# Patient Record
Sex: MALE | Race: WHITE | Employment: FULL TIME | URBAN - METROPOLITAN AREA
[De-identification: names, ages, dates, MRNs, and addresses within clinical notes are randomized per-mention and may not be internally consistent; named-entity substitution may affect disease eponyms.]

---

## 2017-01-09 ENCOUNTER — PATIENT OUTREACH (OUTPATIENT)
Dept: FAMILY MEDICINE CLINIC | Facility: CLINIC | Age: 37
End: 2017-01-09

## 2017-01-09 DIAGNOSIS — E11.9 TYPE II OR UNSPECIFIED TYPE DIABETES MELLITUS WITHOUT MENTION OF COMPLICATION, NOT STATED AS UNCONTROLLED: Primary | ICD-10-CM

## 2017-01-09 NOTE — PROGRESS NOTES
Spoke to patient. I explained that DR. Alberto Randolph wants him to get repeat lab tests. I informed him that the lab tests are in the system.

## 2017-01-11 NOTE — TELEPHONE ENCOUNTER
Make sure he is a diabetic panel ordered for diabetes mellitus type 2 non-insulin-requiring and a follow-up after the blood tests renew these medications for 1 year please.

## 2017-01-12 RX ORDER — ATORVASTATIN CALCIUM 20 MG/1
TABLET, FILM COATED ORAL
Qty: 90 TABLET | Refills: 3 | Status: SHIPPED | OUTPATIENT
Start: 2017-01-12 | End: 2017-02-09 | Stop reason: DRUGHIGH

## 2017-01-12 RX ORDER — LISINOPRIL 5 MG/1
TABLET ORAL
Qty: 90 TABLET | Refills: 3 | Status: SHIPPED | OUTPATIENT
Start: 2017-01-12 | End: 2017-10-25

## 2017-01-21 ENCOUNTER — TELEPHONE (OUTPATIENT)
Dept: INTERNAL MEDICINE CLINIC | Facility: CLINIC | Age: 37
End: 2017-01-21

## 2017-01-26 ENCOUNTER — APPOINTMENT (OUTPATIENT)
Dept: LAB | Facility: HOSPITAL | Age: 37
End: 2017-01-26
Attending: FAMILY MEDICINE
Payer: COMMERCIAL

## 2017-01-26 DIAGNOSIS — E11.9 TYPE II OR UNSPECIFIED TYPE DIABETES MELLITUS WITHOUT MENTION OF COMPLICATION, NOT STATED AS UNCONTROLLED: ICD-10-CM

## 2017-01-26 LAB
ALBUMIN SERPL BCP-MCNC: 3.9 G/DL (ref 3.5–4.8)
ALBUMIN/GLOB SERPL: 1.2 {RATIO} (ref 1–2)
ALP SERPL-CCNC: 55 U/L (ref 32–100)
ALT SERPL-CCNC: 57 U/L (ref 17–63)
ANION GAP SERPL CALC-SCNC: 7 MMOL/L (ref 0–18)
AST SERPL-CCNC: 34 U/L (ref 15–41)
BILIRUB SERPL-MCNC: 0.6 MG/DL (ref 0.3–1.2)
BUN SERPL-MCNC: 15 MG/DL (ref 8–20)
BUN/CREAT SERPL: 17.2 (ref 10–20)
CALCIUM SERPL-MCNC: 9.2 MG/DL (ref 8.5–10.5)
CHLORIDE SERPL-SCNC: 103 MMOL/L (ref 95–110)
CHOLEST SERPL-MCNC: 87 MG/DL (ref 110–200)
CO2 SERPL-SCNC: 30 MMOL/L (ref 22–32)
CREAT SERPL-MCNC: 0.87 MG/DL (ref 0.5–1.5)
CREAT UR-MCNC: 165.1 MG/DL
GLOBULIN PLAS-MCNC: 3.2 G/DL (ref 2.5–3.7)
GLUCOSE SERPL-MCNC: 112 MG/DL (ref 70–99)
HBA1C MFR BLD: 6.1 % (ref 4–6)
HDLC SERPL-MCNC: 37 MG/DL
LDLC SERPL CALC-MCNC: 37 MG/DL (ref 0–99)
MICROALBUMIN UR-MCNC: 0 MG/DL (ref 0–1.8)
MICROALBUMIN/CREAT UR: 0 MG/G{CREAT} (ref 0–20)
NONHDLC SERPL-MCNC: 50 MG/DL
OSMOLALITY UR CALC.SUM OF ELEC: 292 MOSM/KG (ref 275–295)
POTASSIUM SERPL-SCNC: 4.3 MMOL/L (ref 3.3–5.1)
PROT SERPL-MCNC: 7.1 G/DL (ref 5.9–8.4)
SODIUM SERPL-SCNC: 140 MMOL/L (ref 136–144)
TRIGL SERPL-MCNC: 64 MG/DL (ref 1–149)
TSH SERPL-ACNC: 1.55 UIU/ML (ref 0.34–5.6)

## 2017-01-26 PROCEDURE — 82043 UR ALBUMIN QUANTITATIVE: CPT

## 2017-01-26 PROCEDURE — 82570 ASSAY OF URINE CREATININE: CPT

## 2017-01-26 PROCEDURE — 84443 ASSAY THYROID STIM HORMONE: CPT

## 2017-01-26 PROCEDURE — 83036 HEMOGLOBIN GLYCOSYLATED A1C: CPT

## 2017-01-26 PROCEDURE — 80061 LIPID PANEL: CPT

## 2017-01-26 PROCEDURE — 36415 COLL VENOUS BLD VENIPUNCTURE: CPT

## 2017-01-26 PROCEDURE — 80053 COMPREHEN METABOLIC PANEL: CPT

## 2017-01-27 ENCOUNTER — TELEPHONE (OUTPATIENT)
Dept: FAMILY MEDICINE CLINIC | Facility: CLINIC | Age: 37
End: 2017-01-27

## 2017-01-27 DIAGNOSIS — G47.30 SLEEP APNEA, UNSPECIFIED: Primary | ICD-10-CM

## 2017-01-30 NOTE — TELEPHONE ENCOUNTER
Per pt, he stts that he's been using Cpap for a long time and would like to talk to [facility] or RN why he needs to come and see him first.

## 2017-02-03 NOTE — TELEPHONE ENCOUNTER
Refill request for Microlet Lancets to be used twice daily, quantity of 100 with 3 refills was approved and sent to patient's pharmacy.

## 2017-02-09 ENCOUNTER — TELEPHONE (OUTPATIENT)
Dept: FAMILY MEDICINE CLINIC | Facility: CLINIC | Age: 37
End: 2017-02-09

## 2017-02-09 DIAGNOSIS — E78.5 HYPERLIPIDEMIA, UNSPECIFIED HYPERLIPIDEMIA TYPE: Primary | ICD-10-CM

## 2017-02-09 RX ORDER — ATORVASTATIN CALCIUM 10 MG/1
TABLET, FILM COATED ORAL
Qty: 45 TABLET | Refills: 3 | Status: SHIPPED | OUTPATIENT
Start: 2017-02-09 | End: 2017-10-25

## 2017-02-09 NOTE — TELEPHONE ENCOUNTER
----- Message from Greyson Luna DO sent at 1/29/2017  2:05 PM CST -----  GREAT! Reduce lipitor to 5mg nightly 1/2 lipitor 10mg tablet #45 #3refills. Continue other meds and f/u. He must be doing well.

## 2017-02-09 NOTE — TELEPHONE ENCOUNTER
Reviewed 1/26/17 lab results with pt along with Dr SUDHEER Case recommendations. He verbalized understanding and agreed with md plan.

## 2017-05-01 ENCOUNTER — TELEPHONE (OUTPATIENT)
Dept: INTERNAL MEDICINE CLINIC | Facility: CLINIC | Age: 37
End: 2017-05-01

## 2017-05-01 DIAGNOSIS — E11.9 DIABETES MELLITUS WITHOUT COMPLICATION (HCC): Primary | ICD-10-CM

## 2017-05-01 NOTE — TELEPHONE ENCOUNTER
Pt is due for DAA & PCP f/u. Discussed in detail w/patient. Appt made for 5/11/17 w/DAA. Referral placed in [facility].

## 2017-05-11 ENCOUNTER — OFFICE VISIT (OUTPATIENT)
Dept: OPHTHALMOLOGY | Facility: CLINIC | Age: 37
End: 2017-05-11

## 2017-05-11 ENCOUNTER — NURSE ONLY (OUTPATIENT)
Dept: OPHTHALMOLOGY | Facility: CLINIC | Age: 37
End: 2017-05-11

## 2017-05-11 VITALS — DIASTOLIC BLOOD PRESSURE: 80 MMHG | SYSTOLIC BLOOD PRESSURE: 100 MMHG

## 2017-05-11 DIAGNOSIS — E11.9 DIABETES MELLITUS TYPE 2 WITHOUT RETINOPATHY (HCC): Primary | ICD-10-CM

## 2017-05-11 DIAGNOSIS — E11.9 TYPE 2 DIABETES MELLITUS WITHOUT COMPLICATION, WITHOUT LONG-TERM CURRENT USE OF INSULIN (HCC): Primary | ICD-10-CM

## 2017-05-11 PROCEDURE — 99212 OFFICE O/P EST SF 10 MIN: CPT | Performed by: OPHTHALMOLOGY

## 2017-05-11 PROCEDURE — 99243 OFF/OP CNSLTJ NEW/EST LOW 30: CPT | Performed by: OPHTHALMOLOGY

## 2017-05-11 NOTE — PROGRESS NOTES
Diabetic Annual Assessment Clinical Note    /80 mmHg    Foot Assessment    Visual Inspection of the feet completed at appointment.     · Skin Abnormal, dry skin noted   · Callous Abnormal, callous both heels  · Redness Normal  · Swelling Normal  · [facility]

## 2017-05-11 NOTE — PATIENT INSTRUCTIONS
Diabetes mellitus type 2 without retinopathy ([facility] [address]  Diabetes type II: no background of retinopathy, no signs of neovascularization noted. Discussed ocular and systemic benefits of blood sugar control.   Diagnosis and treatment discussed in detail with estelita

## 2017-05-11 NOTE — PROGRESS NOTES
Wil Pablo is here to have medication reconciliation performed by the clinical pharmacist as part of the Diabetic Annual Assessment. Medication Reconciliation: Patient understands his medication regimen and why he has to take his medications. hepatitis B series. Follow-up Appointments: Recommended patient set up follow-up appointment with Dr. Shun Folye.     Jacklyn Bazzi, PharmD Candidate 2017    Juan José Abdi, DottieD  Clinical Pharmacist

## 2017-05-11 NOTE — PROGRESS NOTES
Neda Nguyen is a 39year old male.     HPI:     HPI     Consult    Additional comments: [facility]           Diabetic Eye Exam    Additional comments: Pt has been a diabetic for 1 1/2 years  1 1/2 years on pills/  0 years on Insulin 1 Year.  Disp: 15 pen Rfl: 0   BD PEN NEEDLE MINI U/F 31G X 5 MM Does not apply Misc USE AS DIRECTED Disp: 100 each Rfl: 12   Blood Glucose Monitoring Suppl (DENNY CONTOUR MONITOR) Does not apply Device Please dispense 1 meter along with test strips and matt ASSESSMENT/PLAN:     Diagnoses and Plan:     Diabetes mellitus type 2 without retinopathy ([facility] [address]  Diabetes type II: no background of retinopathy, no signs of neovascularization noted. Discussed ocular and systemic benefits of blood sugar control.   D

## 2017-05-11 NOTE — PROGRESS NOTES
Russell Ayers is a 39year old male.     HPI:       Patient History:  Past Medical History   Diagnosis Date   • Unspecified sleep apnea    • Tonsillitis    • Diabetes [facility])        Surgical History: Russell Ayers has past surgical history that in found for this encounter. No orders of the defined types were placed in this encounter. Meds This Visit:    No prescriptions requested or ordered in this encounter     Follow up instructions:  No Follow-up on file.     5/11/2017  Scribed by: Fouzia Davis

## 2017-07-21 RX ORDER — LIRAGLUTIDE 6 MG/ML
INJECTION SUBCUTANEOUS
Qty: 27 PEN | Refills: 11 | Status: SHIPPED | OUTPATIENT
Start: 2017-07-21 | End: 2017-08-01

## 2017-07-21 NOTE — TELEPHONE ENCOUNTER
Refill protocol failed because the patient did not meet the protocol criteria.     NO f/u appt LOV 1/11/2016    IHSAN please assist with Appt

## 2017-07-21 NOTE — TELEPHONE ENCOUNTER
May have refills for 1 year.   Needs diabetic panel diagnosis diabetes mellitus type 2 controlled no insulin no complications

## 2017-07-21 NOTE — TELEPHONE ENCOUNTER
Diabetes Medications  Protocol Criteria:  · Appointment scheduled in the past 6 months or the next 3 months  · A1C < 7.5 in the past 6 months  · Creatinine in the past 12 months  · Creatinine result < 1.5   Recent Outpatient Visits            2 months ago

## 2017-09-11 DIAGNOSIS — E11.9 TYPE 2 DIABETES MELLITUS WITHOUT COMPLICATION, UNSPECIFIED LONG TERM INSULIN USE STATUS: Primary | ICD-10-CM

## 2017-09-12 RX ORDER — FLURBIPROFEN SODIUM 0.3 MG/ML
SOLUTION/ DROPS OPHTHALMIC
Qty: 100 EACH | Refills: 11 | Status: SHIPPED | OUTPATIENT
Start: 2017-09-12 | End: 2017-10-25

## 2017-09-26 ENCOUNTER — OFFICE VISIT (OUTPATIENT)
Dept: FAMILY MEDICINE CLINIC | Facility: CLINIC | Age: 37
End: 2017-09-26

## 2017-09-26 VITALS
SYSTOLIC BLOOD PRESSURE: 99 MMHG | WEIGHT: 274 LBS | HEART RATE: 69 BPM | DIASTOLIC BLOOD PRESSURE: 64 MMHG | BODY MASS INDEX: 36.31 KG/M2 | HEIGHT: 73 IN

## 2017-09-26 DIAGNOSIS — E78.5 HYPERLIPIDEMIA, UNSPECIFIED HYPERLIPIDEMIA TYPE: ICD-10-CM

## 2017-09-26 DIAGNOSIS — G47.33 OSA (OBSTRUCTIVE SLEEP APNEA): ICD-10-CM

## 2017-09-26 DIAGNOSIS — E11.9 TYPE 2 DIABETES MELLITUS WITHOUT COMPLICATION, UNSPECIFIED LONG TERM INSULIN USE STATUS: Primary | ICD-10-CM

## 2017-09-26 DIAGNOSIS — Z23 NEED FOR VACCINATION: ICD-10-CM

## 2017-09-26 DIAGNOSIS — Z72.0 TOBACCO ABUSE: ICD-10-CM

## 2017-09-26 DIAGNOSIS — E66.09 NON MORBID OBESITY DUE TO EXCESS CALORIES: ICD-10-CM

## 2017-09-26 PROCEDURE — 90686 IIV4 VACC NO PRSV 0.5 ML IM: CPT | Performed by: FAMILY MEDICINE

## 2017-09-26 PROCEDURE — 99214 OFFICE O/P EST MOD 30 MIN: CPT | Performed by: FAMILY MEDICINE

## 2017-09-26 PROCEDURE — 99212 OFFICE O/P EST SF 10 MIN: CPT | Performed by: FAMILY MEDICINE

## 2017-09-26 PROCEDURE — 90471 IMMUNIZATION ADMIN: CPT | Performed by: FAMILY MEDICINE

## 2017-09-26 NOTE — PROGRESS NOTES
Diabetic Visit    My sugars have been good. 5 cigarettes a week. Patient denies problems with their medication. Denies ulcers, chest pain, dyspnea on exertion.   nad  Throat crowded  Ht rrr no M no S3 S4  Lung clear no wheeze  abd soft nontender  Car

## 2017-09-28 ENCOUNTER — APPOINTMENT (OUTPATIENT)
Dept: LAB | Age: 37
End: 2017-09-28
Attending: FAMILY MEDICINE
Payer: COMMERCIAL

## 2017-09-28 DIAGNOSIS — E11.9 TYPE 2 DIABETES MELLITUS WITHOUT COMPLICATION, UNSPECIFIED LONG TERM INSULIN USE STATUS: ICD-10-CM

## 2017-09-28 PROCEDURE — 80053 COMPREHEN METABOLIC PANEL: CPT

## 2017-09-28 PROCEDURE — 83036 HEMOGLOBIN GLYCOSYLATED A1C: CPT

## 2017-09-28 PROCEDURE — 82570 ASSAY OF URINE CREATININE: CPT

## 2017-09-28 PROCEDURE — 82043 UR ALBUMIN QUANTITATIVE: CPT

## 2017-09-28 PROCEDURE — 80061 LIPID PANEL: CPT

## 2017-09-28 PROCEDURE — 36415 COLL VENOUS BLD VENIPUNCTURE: CPT

## 2017-10-25 ENCOUNTER — TELEPHONE (OUTPATIENT)
Dept: FAMILY MEDICINE CLINIC | Facility: CLINIC | Age: 37
End: 2017-10-25

## 2017-10-25 DIAGNOSIS — E78.5 HYPERLIPIDEMIA, UNSPECIFIED HYPERLIPIDEMIA TYPE: ICD-10-CM

## 2017-10-25 RX ORDER — LISINOPRIL 5 MG/1
5 TABLET ORAL
Qty: 90 TABLET | Refills: 0 | Status: SHIPPED | OUTPATIENT
Start: 2017-10-25 | End: 2018-02-01

## 2017-10-25 RX ORDER — ATORVASTATIN CALCIUM 10 MG/1
TABLET, FILM COATED ORAL
Qty: 45 TABLET | Refills: 0 | Status: SHIPPED | OUTPATIENT
Start: 2017-10-25 | End: 2018-02-01

## 2017-10-25 RX ORDER — METFORMIN HYDROCHLORIDE 500 MG/1
TABLET, EXTENDED RELEASE ORAL
Qty: 180 TABLET | Refills: 0 | Status: SHIPPED | OUTPATIENT
Start: 2017-10-25 | End: 2018-02-01

## 2017-10-25 NOTE — TELEPHONE ENCOUNTER
LMTCB, transfer to triage, need to verify pharmacy information as noted prescriptions have been sent to retail [facility] pharmacy    Requesting Metformin, Lisinopril, Victoza, Atorvastatin, pen needles, lancets, and test strip refills  Meds pending receipt of ph [facility]    Nurse Only    5 months ago Diabetes mellitus type 2 without retinopathy [facility])    [facility] Maritza Olmedo MD    Office Visit    1 year ago Ear fullness, left    [facility] Derick Mood, DO    Office Visit            Lab Results  Component Value Date   LDL 66 09/28/2017       Lab Results  Component Value Date   ALT 24 09/28/2017

## 2017-11-29 RX ORDER — METFORMIN HYDROCHLORIDE 500 MG/1
TABLET, EXTENDED RELEASE ORAL
Qty: 180 TABLET | Refills: 4 | OUTPATIENT
Start: 2017-11-29

## 2017-11-30 ENCOUNTER — TELEPHONE (OUTPATIENT)
Dept: FAMILY MEDICINE CLINIC | Facility: CLINIC | Age: 37
End: 2017-11-30

## 2017-11-30 NOTE — TELEPHONE ENCOUNTER
Stephie Seth from [facility] called in to check the refill status on a request she had sent via fax on10/26.  CSS could not see an encounter started for cpap supply refill order, so Stephie Seth was informed and she stated she would re-fax the request again

## 2017-12-01 NOTE — TELEPHONE ENCOUNTER
CPAP supply order was already faxed on 11/07/17. Form with confirmation was scanned. Printed form and refaxed back to [facility].

## 2017-12-14 RX ORDER — ASPIRIN 81 MG/1
TABLET ORAL
Qty: 90 TABLET | Refills: 3 | Status: SHIPPED | OUTPATIENT
Start: 2017-12-14 | End: 2018-09-26

## 2017-12-14 NOTE — TELEPHONE ENCOUNTER
Please advise regarding pended refill request as does not fall under a protocol.      Last Rx= 12/2016 x 1 year  Recent Outpatient Visits            2 months ago Type 2 diabetes mellitus without complication, unspecified long term insulin use status ([facility]

## 2018-02-01 DIAGNOSIS — E78.5 HYPERLIPIDEMIA, UNSPECIFIED HYPERLIPIDEMIA TYPE: ICD-10-CM

## 2018-02-01 RX ORDER — METFORMIN HYDROCHLORIDE 500 MG/1
TABLET, EXTENDED RELEASE ORAL
Qty: 180 TABLET | Refills: 11 | Status: SHIPPED | OUTPATIENT
Start: 2018-02-01 | End: 2019-03-06

## 2018-02-01 RX ORDER — ATORVASTATIN CALCIUM 10 MG/1
TABLET, FILM COATED ORAL
Qty: 45 TABLET | Refills: 11 | Status: SHIPPED | OUTPATIENT
Start: 2018-02-01 | End: 2019-05-15

## 2018-02-01 RX ORDER — LISINOPRIL 5 MG/1
5 TABLET ORAL
Qty: 90 TABLET | Refills: 11 | Status: SHIPPED | OUTPATIENT
Start: 2018-02-01 | End: 2019-02-19

## 2018-08-08 ENCOUNTER — TELEPHONE (OUTPATIENT)
Dept: FAMILY MEDICINE CLINIC | Facility: CLINIC | Age: 38
End: 2018-08-08

## 2018-08-08 DIAGNOSIS — G47.33 OSA (OBSTRUCTIVE SLEEP APNEA): ICD-10-CM

## 2018-08-08 DIAGNOSIS — E11.9 DIABETES MELLITUS TYPE 2 WITHOUT RETINOPATHY (HCC): ICD-10-CM

## 2018-08-08 DIAGNOSIS — E78.5 HYPERLIPIDEMIA, UNSPECIFIED HYPERLIPIDEMIA TYPE: Primary | ICD-10-CM

## 2018-08-08 NOTE — TELEPHONE ENCOUNTER
Per spouse  would like an order to get his blood work done prior to his physical appt. Please, call pt at [phone] when the order is in the system.

## 2018-08-16 ENCOUNTER — LAB ENCOUNTER (OUTPATIENT)
Dept: LAB | Age: 38
End: 2018-08-16
Attending: FAMILY MEDICINE
Payer: COMMERCIAL

## 2018-08-16 DIAGNOSIS — E11.9 DIABETES MELLITUS TYPE 2 WITHOUT RETINOPATHY (HCC): ICD-10-CM

## 2018-08-16 DIAGNOSIS — E78.5 HYPERLIPIDEMIA, UNSPECIFIED HYPERLIPIDEMIA TYPE: ICD-10-CM

## 2018-08-16 DIAGNOSIS — G47.33 OSA (OBSTRUCTIVE SLEEP APNEA): ICD-10-CM

## 2018-08-16 LAB
ALBUMIN SERPL BCP-MCNC: 3.9 G/DL (ref 3.5–4.8)
ALBUMIN/GLOB SERPL: 1.4 {RATIO} (ref 1–2)
ALP SERPL-CCNC: 48 U/L (ref 32–100)
ALT SERPL-CCNC: 31 U/L (ref 17–63)
ANION GAP SERPL CALC-SCNC: 12 MMOL/L (ref 0–18)
AST SERPL-CCNC: 20 U/L (ref 15–41)
BASOPHILS # BLD: 0.1 K/UL (ref 0–0.2)
BASOPHILS NFR BLD: 1 %
BILIRUB SERPL-MCNC: 0.6 MG/DL (ref 0.3–1.2)
BUN SERPL-MCNC: 15 MG/DL (ref 8–20)
BUN/CREAT SERPL: 16.9 (ref 10–20)
CALCIUM SERPL-MCNC: 9 MG/DL (ref 8.5–10.5)
CHLORIDE SERPL-SCNC: 102 MMOL/L (ref 95–110)
CHOLEST SERPL-MCNC: 126 MG/DL (ref 110–200)
CO2 SERPL-SCNC: 23 MMOL/L (ref 22–32)
CREAT SERPL-MCNC: 0.89 MG/DL (ref 0.5–1.5)
CREAT UR-MCNC: 214.8 MG/DL
EOSINOPHIL # BLD: 0.2 K/UL (ref 0–0.7)
EOSINOPHIL NFR BLD: 3 %
ERYTHROCYTE [DISTWIDTH] IN BLOOD BY AUTOMATED COUNT: 13 % (ref 11–15)
GLOBULIN PLAS-MCNC: 2.8 G/DL (ref 2.5–3.7)
GLUCOSE SERPL-MCNC: 101 MG/DL (ref 70–99)
HCT VFR BLD AUTO: 46.3 % (ref 41–52)
HDLC SERPL-MCNC: 41 MG/DL
HGB BLD-MCNC: 15.8 G/DL (ref 13.5–17.5)
LDLC SERPL CALC-MCNC: 57 MG/DL (ref 0–99)
LYMPHOCYTES # BLD: 1.9 K/UL (ref 1–4)
LYMPHOCYTES NFR BLD: 26 %
MCH RBC QN AUTO: 30.1 PG (ref 27–32)
MCHC RBC AUTO-ENTMCNC: 34.1 G/DL (ref 32–37)
MCV RBC AUTO: 88.1 FL (ref 80–100)
MICROALBUMIN UR-MCNC: 0.5 MG/DL (ref 0–1.8)
MICROALBUMIN/CREAT UR: 2.3 MG/G{CREAT} (ref 0–20)
MONOCYTES # BLD: 0.6 K/UL (ref 0–1)
MONOCYTES NFR BLD: 8 %
NEUTROPHILS # BLD AUTO: 4.6 K/UL (ref 1.8–7.7)
NEUTROPHILS NFR BLD: 63 %
NONHDLC SERPL-MCNC: 85 MG/DL
OSMOLALITY UR CALC.SUM OF ELEC: 285 MOSM/KG (ref 275–295)
PATIENT FASTING: YES
PLATELET # BLD AUTO: 204 K/UL (ref 140–400)
PMV BLD AUTO: 9.1 FL (ref 7.4–10.3)
POTASSIUM SERPL-SCNC: 4.1 MMOL/L (ref 3.3–5.1)
PROT SERPL-MCNC: 6.7 G/DL (ref 5.9–8.4)
RBC # BLD AUTO: 5.26 M/UL (ref 4.5–5.9)
SODIUM SERPL-SCNC: 137 MMOL/L (ref 136–144)
TRIGL SERPL-MCNC: 141 MG/DL (ref 1–149)
WBC # BLD AUTO: 7.3 K/UL (ref 4–11)

## 2018-08-16 PROCEDURE — 85025 COMPLETE CBC W/AUTO DIFF WBC: CPT

## 2018-08-16 PROCEDURE — 82043 UR ALBUMIN QUANTITATIVE: CPT

## 2018-08-16 PROCEDURE — 80061 LIPID PANEL: CPT

## 2018-08-16 PROCEDURE — 36415 COLL VENOUS BLD VENIPUNCTURE: CPT

## 2018-08-16 PROCEDURE — 82570 ASSAY OF URINE CREATININE: CPT

## 2018-08-16 PROCEDURE — 80053 COMPREHEN METABOLIC PANEL: CPT

## 2018-08-16 PROCEDURE — 83036 HEMOGLOBIN GLYCOSYLATED A1C: CPT

## 2018-08-17 LAB — HBA1C MFR BLD: 6 % (ref 4–6)

## 2018-09-13 ENCOUNTER — OFFICE VISIT (OUTPATIENT)
Dept: FAMILY MEDICINE CLINIC | Facility: CLINIC | Age: 38
End: 2018-09-13
Payer: COMMERCIAL

## 2018-09-13 VITALS
WEIGHT: 284 LBS | HEIGHT: 73 IN | TEMPERATURE: 98 F | DIASTOLIC BLOOD PRESSURE: 71 MMHG | SYSTOLIC BLOOD PRESSURE: 107 MMHG | HEART RATE: 75 BPM | BODY MASS INDEX: 37.64 KG/M2

## 2018-09-13 DIAGNOSIS — E78.5 HYPERLIPIDEMIA, UNSPECIFIED HYPERLIPIDEMIA TYPE: ICD-10-CM

## 2018-09-13 DIAGNOSIS — Z72.0 TOBACCO ABUSE: ICD-10-CM

## 2018-09-13 DIAGNOSIS — G47.33 OSA (OBSTRUCTIVE SLEEP APNEA): ICD-10-CM

## 2018-09-13 DIAGNOSIS — E66.09 NON MORBID OBESITY DUE TO EXCESS CALORIES: ICD-10-CM

## 2018-09-13 DIAGNOSIS — E11.9 DIABETES MELLITUS TYPE 2 WITHOUT RETINOPATHY (HCC): ICD-10-CM

## 2018-09-13 DIAGNOSIS — Z00.00 ANNUAL PHYSICAL EXAM: Primary | ICD-10-CM

## 2018-09-13 PROCEDURE — 99395 PREV VISIT EST AGE 18-39: CPT | Performed by: FAMILY MEDICINE

## 2018-09-13 PROCEDURE — 90670 PCV13 VACCINE IM: CPT | Performed by: FAMILY MEDICINE

## 2018-09-13 PROCEDURE — 90471 IMMUNIZATION ADMIN: CPT | Performed by: FAMILY MEDICINE

## 2018-09-13 NOTE — PROGRESS NOTES
REASON FOR VISIT:    Jade Piper is a 43year old male who presents for an [address]. I feel good.     Patient Active Problem List:     Simple or chronic serous otitis media     Diabetes mellitus type 2 without retinopathy ([facility] [address] Diuretics)        Potassium  Annually Potassium (mmol/L)   Date Value   08/16/2018 4.1     POTASSIUM (P) (mmol/L)   Date Value   03/23/2016 4.2       Creatinine  Annually Creatinine (mg/dL)   Date Value   08/16/2018 0.89     CREATININE (P) (mg/dL)   Date V 3 months and refills for one year Disp: 1 Device Rfl: 3      MEDICAL INFORMATION:   Past Medical History:   Diagnosis Date   • [facility] ([facility]    • Tonsillitis    • Unspecified sleep apnea       Past Surgical History:  2000: TONSILLECTOMY   Family History masses, HSM or tenderness  : two descended testes, no masses, no hernia, no penile lesions  RECTAL: deferred  MUSCULOSKELETAL: back is not tender, FROM of the back  EXTREMITIES: no cyanosis, clubbing or edema  NEURO: Oriented times three, cranial nerves and not immune

## 2018-09-26 NOTE — TELEPHONE ENCOUNTER
[facility]. [facility] do you approve aspirin 81 mg?  To be send to mail pharmacy

## 2018-09-27 RX ORDER — ASPIRIN 81 MG/1
TABLET ORAL
Qty: 90 TABLET | Refills: 11 | Status: SHIPPED | OUTPATIENT
Start: 2018-09-27 | End: 2019-10-05

## 2018-12-10 RX ORDER — ASPIRIN 81 MG/1
TABLET ORAL
Qty: 90 TABLET | Refills: 0 | Status: SHIPPED | OUTPATIENT
Start: 2018-12-10 | End: 2019-01-10

## 2018-12-11 NOTE — TELEPHONE ENCOUNTER
Refill passed per [facility], [facility] protocol.   Refill Protocol Appointment Criteria  · Appointment scheduled in the past 6 months or in the next 3 months  Recent Outpatient Visits            2 months ago Annual physical exam    Jean Carlosa. Marky Jacinto 1

## 2019-01-02 NOTE — TELEPHONE ENCOUNTER
Refill passed per [facility], [facility] protocol.   Refill Protocol Appointment Criteria  · Appointment scheduled in the past 12 months or in the next 3 months  Recent Outpatient Visits            3 months ago Annual physical exam    [facility]

## 2019-01-10 ENCOUNTER — OFFICE VISIT (OUTPATIENT)
Dept: OTOLARYNGOLOGY | Facility: CLINIC | Age: 39
End: 2019-01-10
Payer: COMMERCIAL

## 2019-01-10 ENCOUNTER — OFFICE VISIT (OUTPATIENT)
Dept: AUDIOLOGY | Facility: CLINIC | Age: 39
End: 2019-01-10
Payer: COMMERCIAL

## 2019-01-10 VITALS
HEIGHT: 73 IN | WEIGHT: 282 LBS | TEMPERATURE: 98 F | BODY MASS INDEX: 37.37 KG/M2 | SYSTOLIC BLOOD PRESSURE: 122 MMHG | DIASTOLIC BLOOD PRESSURE: 74 MMHG

## 2019-01-10 DIAGNOSIS — H61.20 WAX IN EAR: Primary | ICD-10-CM

## 2019-01-10 DIAGNOSIS — H65.32 CHRONIC MUCOID OTITIS MEDIA OF LEFT EAR: ICD-10-CM

## 2019-01-10 DIAGNOSIS — H69.93 EUSTACHIAN TUBE DISORDER, BILATERAL: Primary | ICD-10-CM

## 2019-01-10 PROCEDURE — 92567 TYMPANOMETRY: CPT | Performed by: AUDIOLOGIST

## 2019-01-10 PROCEDURE — 69210 REMOVE IMPACTED EAR WAX UNI: CPT | Performed by: OTOLARYNGOLOGY

## 2019-01-10 PROCEDURE — 92557 COMPREHENSIVE HEARING TEST: CPT | Performed by: AUDIOLOGIST

## 2019-01-10 PROCEDURE — 99243 OFF/OP CNSLTJ NEW/EST LOW 30: CPT | Performed by: OTOLARYNGOLOGY

## 2019-01-10 PROCEDURE — 99212 OFFICE O/P EST SF 10 MIN: CPT | Performed by: OTOLARYNGOLOGY

## 2019-01-10 RX ORDER — FLUTICASONE PROPIONATE 50 MCG
2 SPRAY, SUSPENSION (ML) NASAL DAILY
Qty: 3 BOTTLE | Refills: 4 | Status: SHIPPED | OUTPATIENT
Start: 2019-01-10 | End: 2019-02-09

## 2019-01-10 RX ORDER — METHYLPREDNISOLONE 4 MG/1
TABLET ORAL
Qty: 1 PACKAGE | Refills: 0 | Status: SHIPPED | OUTPATIENT
Start: 2019-01-10 | End: 2019-02-07 | Stop reason: ALTCHOICE

## 2019-01-10 NOTE — PROGRESS NOTES
AUDIOGRAM     Neda Nguyen was referred for testing by Karla Dandy for decreased hearing in both ears, left worse than right   7/21/1980  [id number]        Otoscopic inspection: right ear no cerumen; left ear no cerumen.        Tests/Procedu

## 2019-01-10 NOTE — PROGRESS NOTES
Sabrina Dover is a 43year old male. Patient presents with:  Ear Wax: bilateral ear cleaning        HISTORY OF PRESENT ILLNESS  1/10/2019   Here for evaluation of left greater than right hearing loss.  Patient feels this has worsened over the [place] Sleep Concern: Not Asked        Stress Concern: Not Asked        Weight Concern: Not Asked        Special Diet: Not Asked        Back Care: Not Asked        Exercise: Not Asked        Bike Helmet: Not Asked        Seat Belt: Not Asked        Self-Exam apnea   Neurological Normal Memory - Normal. Cranial nerves - Cranial nerves II through XII grossly intact. Neck Exam Normal  normal to inspection and palpation   Psychiatric Normal   Appropriate mood and affect.    Lymph Detail Normal  no lymphadenopathy TABLETS BY MOUTH EVERY DAY WITH BREAKFAST, Disp: 180 tablet, Rfl: 11  •  Glucose Blood (DENNY CONTOUR TEST) In Vitro Strip, TEST TWICE A DAY, Disp: 100 strip, Rfl: 3  •  DENNY MICROLET LANCETS Does not apply Misc, TEST TWICE A DAY, Disp: 100 each, Rfl: 3

## 2019-02-07 ENCOUNTER — OFFICE VISIT (OUTPATIENT)
Dept: FAMILY MEDICINE CLINIC | Facility: CLINIC | Age: 39
End: 2019-02-07
Payer: COMMERCIAL

## 2019-02-07 ENCOUNTER — HOSPITAL ENCOUNTER (OUTPATIENT)
Dept: GENERAL RADIOLOGY | Age: 39
Discharge: HOME | End: 2019-02-07
Attending: FAMILY MEDICINE
Payer: COMMERCIAL

## 2019-02-07 VITALS
TEMPERATURE: 98 F | BODY MASS INDEX: 37.64 KG/M2 | HEIGHT: 73 IN | HEART RATE: 74 BPM | WEIGHT: 284 LBS | DIASTOLIC BLOOD PRESSURE: 64 MMHG | SYSTOLIC BLOOD PRESSURE: 101 MMHG | RESPIRATION RATE: 16 BRPM

## 2019-02-07 DIAGNOSIS — M54.50 CHRONIC LOW BACK PAIN WITHOUT SCIATICA, UNSPECIFIED BACK PAIN LATERALITY: ICD-10-CM

## 2019-02-07 DIAGNOSIS — G89.29 CHRONIC LOW BACK PAIN WITHOUT SCIATICA, UNSPECIFIED BACK PAIN LATERALITY: ICD-10-CM

## 2019-02-07 DIAGNOSIS — G89.29 CHRONIC LOW BACK PAIN WITHOUT SCIATICA, UNSPECIFIED BACK PAIN LATERALITY: Primary | ICD-10-CM

## 2019-02-07 DIAGNOSIS — M54.50 CHRONIC LOW BACK PAIN WITHOUT SCIATICA, UNSPECIFIED BACK PAIN LATERALITY: Primary | ICD-10-CM

## 2019-02-07 PROCEDURE — 99212 OFFICE O/P EST SF 10 MIN: CPT | Performed by: FAMILY MEDICINE

## 2019-02-07 PROCEDURE — 72110 X-RAY EXAM L-2 SPINE 4/>VWS: CPT | Performed by: FAMILY MEDICINE

## 2019-02-07 PROCEDURE — 99213 OFFICE O/P EST LOW 20 MIN: CPT | Performed by: FAMILY MEDICINE

## 2019-02-07 RX ORDER — CELECOXIB 200 MG/1
200 CAPSULE ORAL 2 TIMES DAILY
Qty: 60 CAPSULE | Refills: 0 | Status: SHIPPED | OUTPATIENT
Start: 2019-02-07 | End: 2019-03-06

## 2019-02-07 RX ORDER — HYDROCODONE BITARTRATE AND ACETAMINOPHEN 5; 325 MG/1; MG/1
1 TABLET ORAL EVERY 4 HOURS PRN
Qty: 20 TABLET | Refills: 0 | Status: SHIPPED | OUTPATIENT
Start: 2019-02-07 | End: 2019-10-24 | Stop reason: ALTCHOICE
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
## 2019-02-12 RX ORDER — BLOOD SUGAR DIAGNOSTIC
STRIP MISCELLANEOUS
Qty: 200 STRIP | Refills: 3 | Status: SHIPPED | OUTPATIENT
Start: 2019-02-12 | End: 2019-10-10

## 2019-02-12 RX ORDER — LANCETS
EACH MISCELLANEOUS
Qty: 200 EACH | Refills: 3 | Status: SHIPPED | OUTPATIENT
Start: 2019-02-12 | End: 2019-10-10

## 2019-02-12 RX ORDER — BLOOD-GLUCOSE METER
EACH MISCELLANEOUS
Qty: 1 KIT | Refills: 0 | Status: SHIPPED | OUTPATIENT
Start: 2019-02-12 | End: 2021-01-13

## 2019-02-12 NOTE — TELEPHONE ENCOUNTER
Fax received at [facility] requesting refill for accu chek rochelle PL test strips and accu chek softclix lancets.  Please include strength, dosage, number of refills

## 2019-02-13 NOTE — TELEPHONE ENCOUNTER
Refill passed per [facility] protocol.   Refill Protocol Appointment Criteria  · Appointment scheduled in the past 12 months or in the next 3 months  Recent Outpatient Visits            5 days ago Chronic low back pain without sciatica, unspecified itzel

## 2019-02-18 ENCOUNTER — TELEPHONE (OUTPATIENT)
Dept: FAMILY MEDICINE CLINIC | Facility: CLINIC | Age: 39
End: 2019-02-18

## 2019-02-19 RX ORDER — LISINOPRIL 5 MG/1
5 TABLET ORAL
Qty: 90 TABLET | Refills: 0 | Status: SHIPPED | OUTPATIENT
Start: 2019-02-19 | End: 2019-04-20

## 2019-02-19 NOTE — TELEPHONE ENCOUNTER
Current Outpatient Medications:  •  lisinopril 5 MG Oral Tab, Take 1 tablet (5 mg total) by mouth once daily. , Disp: 90 tablet, Rfl: 11

## 2019-02-20 NOTE — TELEPHONE ENCOUNTER
Refill passed per [facility], [facility] protocol.   Hypertensive Medications  Protocol Criteria:  · Appointment scheduled in the past 6 months or in the next 3 months  · BMP or CMP in the past 12 months  · Creatinine result < 2  Recent Outpatient Visits

## 2019-03-07 RX ORDER — CELECOXIB 200 MG/1
CAPSULE ORAL
Qty: 60 CAPSULE | Refills: 0 | Status: SHIPPED | OUTPATIENT
Start: 2019-03-07 | End: 2019-10-24 | Stop reason: ALTCHOICE

## 2019-03-07 RX ORDER — METFORMIN HYDROCHLORIDE 500 MG/1
TABLET, EXTENDED RELEASE ORAL
Qty: 180 TABLET | Refills: 11 | Status: SHIPPED | OUTPATIENT
Start: 2019-03-07 | End: 2020-03-10

## 2019-03-07 NOTE — TELEPHONE ENCOUNTER
Please advise in regards to refill request. Thank You  Refill Protocol Appointment Criteria  · Appointment scheduled in the past 6 months or in the next 3 months  Recent Outpatient Visits            3 weeks ago Chronic low back pain without sciatica, unspe

## 2019-04-19 NOTE — TELEPHONE ENCOUNTER
lisinopril 5 MG Oral Tab Take 1 tablet (5 mg total) by mouth once daily.  Disp: 90 tablet Rfl: 0     Requesting 90 day supply

## 2019-04-20 RX ORDER — LISINOPRIL 5 MG/1
5 TABLET ORAL
Qty: 90 TABLET | Refills: 0 | Status: SHIPPED | OUTPATIENT
Start: 2019-04-20 | End: 2019-08-31

## 2019-04-21 NOTE — TELEPHONE ENCOUNTER
Refill passed per [facility], [facility] protocol.   Hypertensive Medications  Protocol Criteria:  · Appointment scheduled in the past 6 months or in the next 3 months  · BMP or CMP in the past 12 months  · Creatinine result < 2  Recent Outpatient Visits

## 2019-05-15 DIAGNOSIS — E78.5 HYPERLIPIDEMIA, UNSPECIFIED HYPERLIPIDEMIA TYPE: ICD-10-CM

## 2019-05-15 RX ORDER — ATORVASTATIN CALCIUM 10 MG/1
TABLET, FILM COATED ORAL
Qty: 45 TABLET | Refills: 1 | Status: SHIPPED | OUTPATIENT
Start: 2019-05-15 | End: 2019-11-15

## 2019-05-15 RX ORDER — ATORVASTATIN CALCIUM 10 MG/1
TABLET, FILM COATED ORAL
Qty: 45 TABLET | Refills: 11 | OUTPATIENT
Start: 2019-05-15

## 2019-06-29 NOTE — TELEPHONE ENCOUNTER
Current Outpatient Medications:   •  Insulin Pen Needle (BD PEN NEEDLE MINI U/F) 31G X 5 MM Does not apply Misc, USE AS DIRECTED, Disp: 100 each, Rfl: 0    Message: please reply with in 2 buisness days to avoid delays in processing.

## 2019-07-01 NOTE — TELEPHONE ENCOUNTER
Pharmacy requesting a refill on BD pen needles, order pending. Insulin Pen Needle (BD PEN NEEDLE MINI U/F) 31G X 5 MM Does not apply Misc 100 each 0 3/11/2019     Sig: USE AS DIRECTED    Sent to pharmacy as:  Insulin Pen Needle 31G X 5 MM Does not apply

## 2019-07-01 NOTE — TELEPHONE ENCOUNTER
Refill passed per [facility], [facility] protocol.     Diabetic Supplies  Protocol Criteria:  · Appointment scheduled in past 12 months or the next 3 months

## 2019-08-19 ENCOUNTER — APPOINTMENT (OUTPATIENT)
Dept: LAB | Age: 39
End: 2019-08-19
Attending: FAMILY MEDICINE
Payer: COMMERCIAL

## 2019-08-19 DIAGNOSIS — E11.9 DIABETES MELLITUS TYPE 2 WITHOUT RETINOPATHY (HCC): ICD-10-CM

## 2019-08-19 DIAGNOSIS — Z00.00 ANNUAL PHYSICAL EXAM: ICD-10-CM

## 2019-08-19 LAB
ALBUMIN SERPL-MCNC: 4 G/DL (ref 3.4–5)
ALBUMIN/GLOB SERPL: 1.1 {RATIO} (ref 1–2)
ALP LIVER SERPL-CCNC: 78 U/L (ref 45–117)
ALT SERPL-CCNC: 39 U/L (ref 16–61)
ANION GAP SERPL CALC-SCNC: 7 MMOL/L (ref 0–18)
AST SERPL-CCNC: 18 U/L (ref 15–37)
BILIRUB SERPL-MCNC: 0.5 MG/DL (ref 0.1–2)
BUN BLD-MCNC: 16 MG/DL (ref 7–18)
BUN/CREAT SERPL: 17 (ref 10–20)
CALCIUM BLD-MCNC: 9.6 MG/DL (ref 8.5–10.1)
CHLORIDE SERPL-SCNC: 103 MMOL/L (ref 98–112)
CHOLEST SMN-MCNC: 167 MG/DL (ref ?–200)
CO2 SERPL-SCNC: 28 MMOL/L (ref 21–32)
CREAT BLD-MCNC: 0.94 MG/DL (ref 0.7–1.3)
CREAT UR-SCNC: 163 MG/DL
EST. AVERAGE GLUCOSE BLD GHB EST-MCNC: 154 MG/DL (ref 68–126)
GLOBULIN PLAS-MCNC: 3.7 G/DL (ref 2.8–4.4)
GLUCOSE BLD-MCNC: 98 MG/DL (ref 70–99)
HBA1C MFR BLD HPLC: 7 % (ref ?–5.7)
HDLC SERPL-MCNC: 46 MG/DL (ref 40–59)
LDLC SERPL CALC-MCNC: 83 MG/DL (ref ?–100)
M PROTEIN MFR SERPL ELPH: 7.7 G/DL (ref 6.4–8.2)
MICROALBUMIN UR-MCNC: 1.03 MG/DL
MICROALBUMIN/CREAT 24H UR-RTO: 6.3 UG/MG (ref ?–30)
NONHDLC SERPL-MCNC: 121 MG/DL (ref ?–130)
OSMOLALITY SERPL CALC.SUM OF ELEC: 287 MOSM/KG (ref 275–295)
PATIENT FASTING: YES
PATIENT FASTING: YES
POTASSIUM SERPL-SCNC: 3.9 MMOL/L (ref 3.5–5.1)
SODIUM SERPL-SCNC: 138 MMOL/L (ref 136–145)
TRIGL SERPL-MCNC: 189 MG/DL (ref 30–149)
VLDLC SERPL CALC-MCNC: 38 MG/DL (ref 0–30)

## 2019-08-19 PROCEDURE — 80061 LIPID PANEL: CPT

## 2019-08-19 PROCEDURE — 80053 COMPREHEN METABOLIC PANEL: CPT

## 2019-08-19 PROCEDURE — 82570 ASSAY OF URINE CREATININE: CPT

## 2019-08-19 PROCEDURE — 82043 UR ALBUMIN QUANTITATIVE: CPT

## 2019-08-19 PROCEDURE — 36415 COLL VENOUS BLD VENIPUNCTURE: CPT

## 2019-08-19 PROCEDURE — 83036 HEMOGLOBIN GLYCOSYLATED A1C: CPT

## 2019-09-01 RX ORDER — LISINOPRIL 5 MG/1
TABLET ORAL
Qty: 90 TABLET | Refills: 1 | Status: SHIPPED | OUTPATIENT
Start: 2019-09-01 | End: 2020-03-10

## 2019-10-07 NOTE — TELEPHONE ENCOUNTER
Fax received in [facility] requesting refill.        Current Outpatient Medications:  Blood Glucose Monitoring Suppl (ACCU-CHEK ANANT PLUS) w/Device Does not apply Kit Test glucose twice daily Disp: 1 kit Rfl: 0

## 2019-10-08 RX ORDER — ASPIRIN 81 MG/1
TABLET ORAL
Qty: 90 TABLET | Refills: 1 | Status: SHIPPED | OUTPATIENT
Start: 2019-10-08 | End: 2021-09-01

## 2019-10-08 NOTE — TELEPHONE ENCOUNTER
Refill Protocol Appointment Criteria  · Appointment scheduled in the past 12 months or in the next 3 months  Recent Outpatient Visits            8 months ago Chronic low back pain without sciatica, unspecified back pain laterality    [facility]

## 2019-10-10 RX ORDER — LANCETS
EACH MISCELLANEOUS
Qty: 200 EACH | Refills: 3 | Status: SHIPPED | OUTPATIENT
Start: 2019-10-10

## 2019-10-10 RX ORDER — BLOOD SUGAR DIAGNOSTIC
STRIP MISCELLANEOUS
Qty: 200 STRIP | Refills: 3 | Status: SHIPPED | OUTPATIENT
Start: 2019-10-10 | End: 2021-01-04

## 2019-10-24 ENCOUNTER — OFFICE VISIT (OUTPATIENT)
Dept: FAMILY MEDICINE CLINIC | Facility: CLINIC | Age: 39
End: 2019-10-24
Payer: COMMERCIAL

## 2019-10-24 VITALS
BODY MASS INDEX: 38.83 KG/M2 | TEMPERATURE: 98 F | HEART RATE: 77 BPM | DIASTOLIC BLOOD PRESSURE: 77 MMHG | WEIGHT: 293 LBS | HEIGHT: 73 IN | SYSTOLIC BLOOD PRESSURE: 121 MMHG

## 2019-10-24 DIAGNOSIS — G47.33 OSA (OBSTRUCTIVE SLEEP APNEA): ICD-10-CM

## 2019-10-24 DIAGNOSIS — Z23 ENCOUNTER FOR IMMUNIZATION: ICD-10-CM

## 2019-10-24 DIAGNOSIS — E11.9 DIABETES MELLITUS TYPE 2 WITHOUT RETINOPATHY (HCC): ICD-10-CM

## 2019-10-24 DIAGNOSIS — Z00.00 ANNUAL PHYSICAL EXAM: Primary | ICD-10-CM

## 2019-10-24 DIAGNOSIS — Z30.09 FAMILY PLANNING: ICD-10-CM

## 2019-10-24 DIAGNOSIS — E66.09 NON MORBID OBESITY DUE TO EXCESS CALORIES: ICD-10-CM

## 2019-10-24 DIAGNOSIS — E78.5 HYPERLIPIDEMIA, UNSPECIFIED HYPERLIPIDEMIA TYPE: ICD-10-CM

## 2019-10-24 PROCEDURE — 90715 TDAP VACCINE 7 YRS/> IM: CPT | Performed by: FAMILY MEDICINE

## 2019-10-24 PROCEDURE — 99396 PREV VISIT EST AGE 40-64: CPT | Performed by: FAMILY MEDICINE

## 2019-10-24 PROCEDURE — 90471 IMMUNIZATION ADMIN: CPT | Performed by: FAMILY MEDICINE

## 2019-10-24 PROCEDURE — 90686 IIV4 VACC NO PRSV 0.5 ML IM: CPT | Performed by: FAMILY MEDICINE

## 2019-10-24 PROCEDURE — 90472 IMMUNIZATION ADMIN EACH ADD: CPT | Performed by: FAMILY MEDICINE

## 2019-10-24 RX ORDER — PHENTERMINE HYDROCHLORIDE 15 MG/1
15 CAPSULE ORAL EVERY MORNING
Qty: 30 CAPSULE | Refills: 0 | Status: SHIPPED | OUTPATIENT
Start: 2019-10-24 | End: 2020-03-03 | Stop reason: ALTCHOICE

## 2019-10-24 RX ORDER — PEN NEEDLE, DIABETIC 30 GX3/16"
1 NEEDLE, DISPOSABLE MISCELLANEOUS DAILY
Qty: 90 EACH | Refills: 2 | Status: SHIPPED | OUTPATIENT
Start: 2019-10-24 | End: 2019-11-23

## 2019-10-24 RX ORDER — PHENTERMINE HYDROCHLORIDE 15 MG/1
15 CAPSULE ORAL EVERY MORNING
Qty: 30 CAPSULE | Refills: 0 | Status: SHIPPED
Start: 2019-11-23 | End: 2019-12-23

## 2019-10-24 NOTE — PROGRESS NOTES
REASON FOR VISIT:    Linda Mcgarry is a 35year old male who presents for an [address]. Weight loss discussed. New baby girl due in a few weeks.   Wants to get vasectomy    Patient Active Problem List:     Simple or chronic serous o MONITORING Internal Lab or Procedure     Annual Monitoring of Persistent Medications  (ACE/ARB, Digoxin, Diuretics)        Potassium  Annually Potassium (mmol/L)   Date Value   08/19/2019 3.9     POTASSIUM (P) (mmol/L)   Date Value   03/23/2016 4.2       C apply Misc, Test glucose twice daily, Disp: 200 each, Rfl: 3  Insulin Pen Needle (BD PEN NEEDLE MINI U/F) 31G X 5 MM Does not apply Misc, USE AS DIRECTED, Disp: 100 each, Rfl: 3  DENNY MICROLET LANCETS Does not apply Misc, TEST TWICE A DAY, Disp: 100 each, lesions  HEENT: atraumatic, normocephalic, ears and throat are clear  EYES:PERRLA, EOMI, conjunctiva are clear.  normal optic disc  NECK: supple, no adenopathy, no bruits  CHEST: no chest tenderness  LUNGS: clear to auscultation  CARDIO: RRR without murmur capsule; Refill: 0    5. Hyperlipidemia, unspecified hyperlipidemia type  Recheck 3 mo  6. Family planning  referral  - UROLOGY - INTERNAL    7.  Encounter for immunization  given  - TETANUS, DIPHTHERIA TOXOIDS AND ACELLULAR PERTUSIS VACCINE (TDAP), >7 YEAR

## 2019-11-15 DIAGNOSIS — E78.5 HYPERLIPIDEMIA, UNSPECIFIED HYPERLIPIDEMIA TYPE: ICD-10-CM

## 2019-11-15 RX ORDER — ATORVASTATIN CALCIUM 10 MG/1
TABLET, FILM COATED ORAL
Qty: 45 TABLET | Refills: 1 | Status: SHIPPED | OUTPATIENT
Start: 2019-11-15 | End: 2020-03-10

## 2019-11-15 NOTE — TELEPHONE ENCOUNTER
Refill passed per [facility], [facility] protocol. Requested Prescriptions   Pending Prescriptions Disp Refills   • ATORVASTATIN 10 MG Oral Tab [Pharmacy Med Name: ATORVASTATIN 10 MG TABLET] 45 tablet 1     Sig: TAKE 1/2 TABLET (5 MG TOTAL) BY MOUTH NIGHTLY.

## 2020-02-11 ENCOUNTER — OFFICE VISIT (OUTPATIENT)
Dept: SURGERY | Facility: CLINIC | Age: 40
End: 2020-02-11
Payer: COMMERCIAL

## 2020-02-11 VITALS
HEIGHT: 73 IN | DIASTOLIC BLOOD PRESSURE: 78 MMHG | BODY MASS INDEX: 38.83 KG/M2 | SYSTOLIC BLOOD PRESSURE: 122 MMHG | HEART RATE: 76 BPM | WEIGHT: 293 LBS

## 2020-02-11 DIAGNOSIS — Z30.09 VASECTOMY EVALUATION: Primary | ICD-10-CM

## 2020-02-11 PROCEDURE — 99203 OFFICE O/P NEW LOW 30 MIN: CPT | Performed by: UROLOGY

## 2020-02-11 RX ORDER — LIRAGLUTIDE 6 MG/ML
INJECTION, SOLUTION SUBCUTANEOUS
COMMUNITY
Start: 2020-01-31 | End: 2020-03-10

## 2020-02-11 NOTE — PROGRESS NOTES
[facility] Urology  Initial Office Consultation    HPI:   Prabhjot Null is a 35year old male here today for consultation at the request of, and a copy of this note will be sent to, Kiley Case DO.     Patient is here in consultation for well-nourished. No distress. HENT:   Head: Normocephalic and atraumatic. Eyes: Conjunctivae are normal.   Neck: Neck supple. Cardiovascular: Normal rate. Pulmonary/Chest: Effort normal.   Abdominal: Soft. He exhibits no distension and no mass.  The PVSA showing rare non-motile sperm (RNMS). · Repeat vasectomy is necessary in ?1% of vasectomies, provided that a technique for vas occlusion known to have a low occlusive failure rate has been used.     · Patients should refrain from ejaculation for vangie

## 2020-03-03 ENCOUNTER — OFFICE VISIT (OUTPATIENT)
Dept: FAMILY MEDICINE CLINIC | Facility: CLINIC | Age: 40
End: 2020-03-03
Payer: COMMERCIAL

## 2020-03-03 VITALS
SYSTOLIC BLOOD PRESSURE: 116 MMHG | HEART RATE: 85 BPM | DIASTOLIC BLOOD PRESSURE: 66 MMHG | WEIGHT: 292 LBS | HEIGHT: 73 IN | TEMPERATURE: 99 F | OXYGEN SATURATION: 98 % | RESPIRATION RATE: 20 BRPM | BODY MASS INDEX: 38.7 KG/M2

## 2020-03-03 DIAGNOSIS — B97.89 VIRAL RESPIRATORY INFECTION: Primary | ICD-10-CM

## 2020-03-03 DIAGNOSIS — R68.89 FLU-LIKE SYMPTOMS: ICD-10-CM

## 2020-03-03 DIAGNOSIS — J98.8 VIRAL RESPIRATORY INFECTION: Primary | ICD-10-CM

## 2020-03-03 LAB
OPERATOR ID: NORMAL
POCT INFLUENZA A: NEGATIVE
POCT INFLUENZA B: NEGATIVE

## 2020-03-03 PROCEDURE — 99202 OFFICE O/P NEW SF 15 MIN: CPT | Performed by: PHYSICIAN ASSISTANT

## 2020-03-03 PROCEDURE — 87502 INFLUENZA DNA AMP PROBE: CPT | Performed by: PHYSICIAN ASSISTANT

## 2020-03-03 NOTE — PROGRESS NOTES
CHIEF COMPLAINT:   Patient presents with: Body ache and/or chills: since yesterday, HA, body aches, \"heavy head\", temp 100.1       HPI:   Radha López is a 35year old male who presents for sudden onset of flu-like symptoms.  Symptoms began yester Tobacco comment: socially    Alcohol use:  Yes      Alcohol/week: 0.0 standard drinks      Comment: YES, per [employer] beer and liquor occ    Drug use: No        REVIEW OF SYSTEMS:   GENERAL: feels chilled, feverish.  good appetite  SKIN: no rashes or abn Complications of viral infections discussed. Including secondary infections like AOM, bronchitis, PNA, sinusitis. Patient needs to be rechecked if exhibiting any symptoms of these illnesses.        Meds & Refills for this Visit:    The patient indicates un · You may use over-the-counter acetaminophen or ibuprofen for fever, muscle aching, and headache, unless another medicine was prescribed for this.  If you have chronic liver or kidney disease or ever had a stomach ulcer or gastrointestinal bleeding, talk wi · Frequent diarrhea (more than 5 times a day); blood (red or black color) or mucus in diarrhea  · Feeling weak, dizzy, or like you are going to faint  · Extreme thirst  · Fever of 100.4°F (38°C) or higher, or as directed by your healthcare provider  Date L

## 2020-03-03 NOTE — PATIENT INSTRUCTIONS
Viral Syndrome (Adult)  A viral illness may cause a number of symptoms such as fever. Other symptoms depend on the part of the body that the virus affects.  If it settles in your nose, throat, and lungs, it may cause cough, sore throat, congestion, runny · Your appetite may be poor, so a light diet is fine. Avoid dehydration by drinking 8 to 12, 8-ounce glasses of fluids each day.  This may include water; orange juice; lemonade; apple, grape, and cranberry juice; clear fruit drinks; electrolyte replacement © 3759-9087 [address] [facility] [facility] [employer] [place]. All rights reserved. This information is not intended as a substitute for professional medical care. Always follow your healthcare professional's instructions.

## 2020-03-10 DIAGNOSIS — E78.5 HYPERLIPIDEMIA, UNSPECIFIED HYPERLIPIDEMIA TYPE: ICD-10-CM

## 2020-03-10 RX ORDER — METFORMIN HYDROCHLORIDE 500 MG/1
TABLET, EXTENDED RELEASE ORAL
Qty: 180 TABLET | Refills: 1 | Status: SHIPPED | OUTPATIENT
Start: 2020-03-10 | End: 2020-06-17

## 2020-03-10 RX ORDER — ATORVASTATIN CALCIUM 10 MG/1
TABLET, FILM COATED ORAL
Qty: 45 TABLET | Refills: 1 | Status: SHIPPED | OUTPATIENT
Start: 2020-03-10 | End: 2020-10-09

## 2020-03-10 RX ORDER — LIRAGLUTIDE 6 MG/ML
3 INJECTION, SOLUTION SUBCUTANEOUS DAILY
Qty: 15 PEN | Refills: 3 | Status: SHIPPED | OUTPATIENT
Start: 2020-03-10 | End: 2020-04-30

## 2020-03-10 RX ORDER — LISINOPRIL 5 MG/1
5 TABLET ORAL
Qty: 90 TABLET | Refills: 1 | Status: SHIPPED | OUTPATIENT
Start: 2020-03-10 | End: 2020-06-17

## 2020-03-10 NOTE — TELEPHONE ENCOUNTER
NO PROTOCOL for Saxenda. Please review; protocol failed.     Requested Prescriptions     Pending Prescriptions Disp Refills   • metFORMIN HCl  MG Oral Tablet 24 Hr 180 tablet 11     Sig: TAKE 2 TABLETS BY MOUTH EVERY DAY WITH BREAKFAST   • SAXENDA

## 2020-03-10 NOTE — TELEPHONE ENCOUNTER
Refill passed per [facility] protocol.     Hypertensive Medications  Protocol Criteria:  · Appointment scheduled in the past 6 months or in the next 3 months  · BMP or CMP in the past 12 months  · Creatinine result < 2  Recent Outpatient Visits months ago Annual physical exam    [facility],     Office Visit    1 year ago Chronic low back pain without sciatica, unspecified back pain rohith [address], [place]

## 2020-03-10 NOTE — TELEPHONE ENCOUNTER
Clarified medications needed, dose/SIG. He needs them to [facility] due to insurance change. CURRENT MEDICATIONS:   Liraglutide -Weight Management (SAXENDA) 18 MG/3ML Subcutaneous Solution Pen-injector, Inject 3 mg into the skin daily. , Disp: 15 pen,

## 2020-03-10 NOTE — TELEPHONE ENCOUNTER
LISINOPRIL 5 MG Oral Tab, TAKE 1 TABLET DAILY, Disp: 90 tablet, Rfl: 1  metFORMIN HCl  MG Oral Tablet 24 Hr, TAKE 2 TABLETS BY MOUTH EVERY DAY WITH BREAKFAST, Disp: 180 tablet, Rfl: 11    Pharmacy note: Pt requests new Rx

## 2020-03-12 ENCOUNTER — TELEPHONE (OUTPATIENT)
Dept: FAMILY MEDICINE CLINIC | Facility: CLINIC | Age: 40
End: 2020-03-12

## 2020-03-12 NOTE — TELEPHONE ENCOUNTER
Pharmacy comments:  Please provide directions, refills, and quantity for a 90 day supple on SAXENDA 6MG/ML PEN INJ

## 2020-03-13 NOTE — TELEPHONE ENCOUNTER
Spoke with wife of patient who is on MALORIE. Verified name and  of patient. Milly Bright, wife of  patient how he is taking [place]. Informed wife left a message for patient to contact our office for use of Saxenda.  Wife  states he is injecting

## 2020-04-30 RX ORDER — LIRAGLUTIDE 6 MG/ML
3 INJECTION, SOLUTION SUBCUTANEOUS DAILY
Qty: 1 PEN | Refills: 3 | Status: SHIPPED | OUTPATIENT
Start: 2020-04-30 | End: 2020-05-02

## 2020-05-02 ENCOUNTER — TELEPHONE (OUTPATIENT)
Dept: FAMILY MEDICINE CLINIC | Facility: CLINIC | Age: 40
End: 2020-05-02

## 2020-05-02 NOTE — TELEPHONE ENCOUNTER
Christian or   please see message below.  I have pended the medication for your review and approval.

## 2020-05-02 NOTE — TELEPHONE ENCOUNTER
Patient's wife is calling and states SAXENDA 18 MG/3ML Subcutaneous Solution Pen-injector is suppose to be a 3 month supply.  Would like this corrected, and states patient is out of meds

## 2020-05-06 ENCOUNTER — TELEPHONE (OUTPATIENT)
Dept: FAMILY MEDICINE CLINIC | Facility: CLINIC | Age: 40
End: 2020-05-06

## 2020-05-06 NOTE — TELEPHONE ENCOUNTER
Fax received in [facility] requesting PA through cover my meds. Use Key - [id number].     Current Outpatient Medications   Medication Sig Dispense Refill   • Liraglutide -Weight Management (SAXENDA) 18 MG/3ML Subcutaneous Solution Pen-injector Inject 3 mg into the

## 2020-05-06 NOTE — TELEPHONE ENCOUNTER
Prior authorization for Saxenda completed w/ [employer] on cover my meds Key: [id number], turn around time 3-5 days.

## 2020-06-17 ENCOUNTER — TELEPHONE (OUTPATIENT)
Dept: FAMILY MEDICINE CLINIC | Facility: CLINIC | Age: 40
End: 2020-06-17

## 2020-06-17 RX ORDER — LISINOPRIL 5 MG/1
5 TABLET ORAL
Qty: 90 TABLET | Refills: 1 | Status: SHIPPED | OUTPATIENT
Start: 2020-06-17 | End: 2020-09-02

## 2020-06-17 RX ORDER — METFORMIN HYDROCHLORIDE 500 MG/1
TABLET, EXTENDED RELEASE ORAL
Qty: 180 TABLET | Refills: 1 | Status: SHIPPED | OUTPATIENT
Start: 2020-06-17 | End: 2020-09-02

## 2020-06-17 NOTE — TELEPHONE ENCOUNTER
lisinopril 5 MG Oral Tab, Take 1 tablet (5 mg total) by mouth once daily. , Disp: 90 tablet, Rfl: 1  metFORMIN HCl  MG Oral Tablet 24 Hr, TAKE 2 TABLETS BY MOUTH EVERY DAY WITH BREAKFAST, Disp: 180 tablet, Rfl: 1

## 2020-06-18 NOTE — TELEPHONE ENCOUNTER
Patient returned call and was advised of Chad Marquis's note below. Patient states he will call back later to make appointment.

## 2020-07-07 ENCOUNTER — OFFICE VISIT (OUTPATIENT)
Dept: FAMILY MEDICINE CLINIC | Facility: CLINIC | Age: 40
End: 2020-07-07
Payer: COMMERCIAL

## 2020-07-07 VITALS
WEIGHT: 290 LBS | SYSTOLIC BLOOD PRESSURE: 120 MMHG | OXYGEN SATURATION: 97 % | DIASTOLIC BLOOD PRESSURE: 64 MMHG | TEMPERATURE: 98 F | RESPIRATION RATE: 16 BRPM | HEIGHT: 73 IN | BODY MASS INDEX: 38.43 KG/M2 | HEART RATE: 76 BPM

## 2020-07-07 DIAGNOSIS — H00.011 HORDEOLUM EXTERNUM OF RIGHT UPPER EYELID: Primary | ICD-10-CM

## 2020-07-07 PROCEDURE — 99213 OFFICE O/P EST LOW 20 MIN: CPT | Performed by: NURSE PRACTITIONER

## 2020-07-07 RX ORDER — TOBRAMYCIN 3 MG/ML
SOLUTION/ DROPS OPHTHALMIC
Qty: 1 BOTTLE | Refills: 0 | Status: SHIPPED | OUTPATIENT
Start: 2020-07-07 | End: 2021-01-13

## 2020-07-07 NOTE — PROGRESS NOTES
CHIEF COMPLAINT:   Patient presents with:  Sty: pt reports sty for 5 days feels it is worsening. HPI:   Toy Castleman is a 35year old male who presents with chief complaint of bump on right upper eyelid. Symptoms began 5 days ago.  Symptoms have • DENNY MICROLET LANCETS Does not apply Misc TEST TWICE A  each 3      Past Medical History:   Diagnosis Date   • Diabetes ([facility] [address]    • Hyperlipidemia    • Tonsillitis    • Unspecified sleep apnea       Past Surgical History:   Procedure Laterality Da 2+ edema and redness of right upper lid, previous site of sty visible with white pointing dot, pt reports that sty ruptured before he bean using the OTC medication, denies any eye pain or change in vision. Advised to stop using the OTC rx.  Educated on inst · Artificial tears may also be used to lubricate the eye and make it more comfortable. You can buy these over the counter without a prescription. Talk with your healthcare provider before using any over-the-counter treatment for a sty.   · Apply a warm, dam

## 2020-07-07 NOTE — PATIENT INSTRUCTIONS
Sty (or Stye)  A sty is an infection of the oil gland of the eyelid. It may develop into a small pocket of pus (an abscess). This can cause pain, redness, and swelling.  In early stages, a sty is treated with antibiotic cream, eye drops, or a small towel © 0710-0808 [address] [facility] [facility], [facility] [place]. All rights reserved. This information is not intended as a substitute for professional medical care. Always follow your healthcare professional's instructions.

## 2020-09-02 ENCOUNTER — LAB ENCOUNTER (OUTPATIENT)
Dept: LAB | Age: 40
End: 2020-09-02
Attending: INTERNAL MEDICINE
Payer: COMMERCIAL

## 2020-09-02 ENCOUNTER — APPOINTMENT (OUTPATIENT)
Dept: LAB | Age: 40
End: 2020-09-02
Attending: INTERNAL MEDICINE
Payer: COMMERCIAL

## 2020-09-02 ENCOUNTER — OFFICE VISIT (OUTPATIENT)
Dept: INTERNAL MEDICINE CLINIC | Facility: CLINIC | Age: 40
End: 2020-09-02
Payer: COMMERCIAL

## 2020-09-02 VITALS
HEIGHT: 73.5 IN | DIASTOLIC BLOOD PRESSURE: 66 MMHG | HEART RATE: 79 BPM | TEMPERATURE: 97 F | SYSTOLIC BLOOD PRESSURE: 104 MMHG | WEIGHT: 290 LBS | BODY MASS INDEX: 37.62 KG/M2

## 2020-09-02 DIAGNOSIS — E66.09 CLASS 2 OBESITY DUE TO EXCESS CALORIES WITHOUT SERIOUS COMORBIDITY WITH BODY MASS INDEX (BMI) OF 37.0 TO 37.9 IN ADULT: ICD-10-CM

## 2020-09-02 DIAGNOSIS — E78.5 HYPERLIPIDEMIA, UNSPECIFIED HYPERLIPIDEMIA TYPE: ICD-10-CM

## 2020-09-02 DIAGNOSIS — E78.5 HYPERLIPIDEMIA, UNSPECIFIED HYPERLIPIDEMIA TYPE: Primary | ICD-10-CM

## 2020-09-02 DIAGNOSIS — E11.9 DIABETES MELLITUS TYPE 2 WITHOUT RETINOPATHY (HCC): ICD-10-CM

## 2020-09-02 LAB
ALBUMIN SERPL-MCNC: 3.7 G/DL (ref 3.4–5)
ALBUMIN/GLOB SERPL: 1 {RATIO} (ref 1–2)
ALP LIVER SERPL-CCNC: 81 U/L (ref 45–117)
ALT SERPL-CCNC: 34 U/L (ref 16–61)
ANION GAP SERPL CALC-SCNC: 6 MMOL/L (ref 0–18)
AST SERPL-CCNC: 13 U/L (ref 15–37)
BACTERIA UR QL AUTO: NEGATIVE /HPF
BILIRUB SERPL-MCNC: 0.4 MG/DL (ref 0.1–2)
BUN BLD-MCNC: 15 MG/DL (ref 7–18)
BUN/CREAT SERPL: 19 (ref 10–20)
CALCIUM BLD-MCNC: 9.2 MG/DL (ref 8.5–10.1)
CHLORIDE SERPL-SCNC: 103 MMOL/L (ref 98–112)
CHOLEST SMN-MCNC: 134 MG/DL (ref ?–200)
CO2 SERPL-SCNC: 28 MMOL/L (ref 21–32)
CREAT BLD-MCNC: 0.79 MG/DL (ref 0.7–1.3)
DEPRECATED RDW RBC AUTO: 37.8 FL (ref 35.1–46.3)
ERYTHROCYTE [DISTWIDTH] IN BLOOD BY AUTOMATED COUNT: 12 % (ref 11–15)
EST. AVERAGE GLUCOSE BLD GHB EST-MCNC: 166 MG/DL (ref 68–126)
FOLATE SERPL-MCNC: 17.1 NG/ML (ref 8.7–?)
GLOBULIN PLAS-MCNC: 3.8 G/DL (ref 2.8–4.4)
GLUCOSE BLD-MCNC: 101 MG/DL (ref 70–99)
HBA1C MFR BLD HPLC: 7.4 % (ref ?–5.7)
HCT VFR BLD AUTO: 46.4 % (ref 39–53)
HDLC SERPL-MCNC: 42 MG/DL (ref 40–59)
HGB BLD-MCNC: 16 G/DL (ref 13–17.5)
LDLC SERPL CALC-MCNC: 66 MG/DL (ref ?–100)
M PROTEIN MFR SERPL ELPH: 7.5 G/DL (ref 6.4–8.2)
MCH RBC QN AUTO: 29.7 PG (ref 26–34)
MCHC RBC AUTO-ENTMCNC: 34.5 G/DL (ref 31–37)
MCV RBC AUTO: 86.1 FL (ref 80–100)
NONHDLC SERPL-MCNC: 92 MG/DL (ref ?–130)
OSMOLALITY SERPL CALC.SUM OF ELEC: 285 MOSM/KG (ref 275–295)
PATIENT FASTING Y/N/NP: YES
PATIENT FASTING Y/N/NP: YES
PLATELET # BLD AUTO: 225 10(3)UL (ref 150–450)
POTASSIUM SERPL-SCNC: 4 MMOL/L (ref 3.5–5.1)
PROT UR-MCNC: 17.3 MG/DL
RBC # BLD AUTO: 5.39 X10(6)UL (ref 4.3–5.7)
RBC #/AREA URNS AUTO: 1 /HPF
SODIUM SERPL-SCNC: 137 MMOL/L (ref 136–145)
T4 FREE SERPL-MCNC: 1.1 NG/DL (ref 0.8–1.7)
TRIGL SERPL-MCNC: 130 MG/DL (ref 30–149)
TSI SER-ACNC: 1.75 MIU/ML (ref 0.36–3.74)
VIT B12 SERPL-MCNC: 431 PG/ML (ref 193–986)
VLDLC SERPL CALC-MCNC: 26 MG/DL (ref 0–30)
WBC # BLD AUTO: 7.1 X10(3) UL (ref 4–11)
WBC #/AREA URNS AUTO: <1 /HPF

## 2020-09-02 PROCEDURE — 82607 VITAMIN B-12: CPT

## 2020-09-02 PROCEDURE — 3074F SYST BP LT 130 MM HG: CPT | Performed by: INTERNAL MEDICINE

## 2020-09-02 PROCEDURE — 82746 ASSAY OF FOLIC ACID SERUM: CPT

## 2020-09-02 PROCEDURE — 84439 ASSAY OF FREE THYROXINE: CPT

## 2020-09-02 PROCEDURE — 84156 ASSAY OF PROTEIN URINE: CPT

## 2020-09-02 PROCEDURE — 93010 ELECTROCARDIOGRAM REPORT: CPT | Performed by: INTERNAL MEDICINE

## 2020-09-02 PROCEDURE — 81015 MICROSCOPIC EXAM OF URINE: CPT

## 2020-09-02 PROCEDURE — 99214 OFFICE O/P EST MOD 30 MIN: CPT | Performed by: INTERNAL MEDICINE

## 2020-09-02 PROCEDURE — 80053 COMPREHEN METABOLIC PANEL: CPT

## 2020-09-02 PROCEDURE — 36415 COLL VENOUS BLD VENIPUNCTURE: CPT

## 2020-09-02 PROCEDURE — 84443 ASSAY THYROID STIM HORMONE: CPT

## 2020-09-02 PROCEDURE — 83036 HEMOGLOBIN GLYCOSYLATED A1C: CPT

## 2020-09-02 PROCEDURE — 85027 COMPLETE CBC AUTOMATED: CPT

## 2020-09-02 PROCEDURE — 3008F BODY MASS INDEX DOCD: CPT | Performed by: INTERNAL MEDICINE

## 2020-09-02 PROCEDURE — 3078F DIAST BP <80 MM HG: CPT | Performed by: INTERNAL MEDICINE

## 2020-09-02 PROCEDURE — 80061 LIPID PANEL: CPT

## 2020-09-02 PROCEDURE — 93005 ELECTROCARDIOGRAM TRACING: CPT

## 2020-09-02 RX ORDER — LISINOPRIL 5 MG/1
5 TABLET ORAL
Qty: 90 TABLET | Refills: 1 | Status: SHIPPED | OUTPATIENT
Start: 2020-09-02 | End: 2021-05-31

## 2020-09-02 RX ORDER — METFORMIN HYDROCHLORIDE 500 MG/1
TABLET, EXTENDED RELEASE ORAL
Qty: 180 TABLET | Refills: 1 | Status: SHIPPED | OUTPATIENT
Start: 2020-09-02 | End: 2020-12-07

## 2020-09-02 NOTE — PROGRESS NOTES
HPI:    Patient ID: August Elias is a 38year old male.     HPI    Diabetes  Obesity   Eating healthy monitors glucose    Feels well in general    /66 (BP Location: Right arm, Patient Position: Sitting, Cuff Size: large)   Pulse 79   Temp 96.9 BREAKFAST 180 tablet 1   • Liraglutide -Weight Management (SAXENDA) 18 MG/3ML Subcutaneous Solution Pen-injector Inject 3 mg into the skin daily.  15 pen 3   • atorvastatin 10 MG Oral Tab Take 1/2 tablet by mouth daily 45 tablet 1   • Glucose Blood (ACCU-CH Ear: External ear normal.   Mouth/Throat: Oropharynx is clear and moist. No oropharyngeal exudate. Eyes: Pupils are equal, round, and reactive to light. Conjunctivae are normal. Right eye exhibits no discharge. Left eye exhibits no discharge.  No scleral WEIGHT    Medications and most recent test results reviewed  Refill medicaitons  as needed  Potential side effect discussed  Modification of risk for CAD advised    Dietary an lifestyle change  Pt voiced understanding and agrees with plan  Pt given time to

## 2020-09-11 PROBLEM — H00.011 HORDEOLUM EXTERNUM OF RIGHT UPPER EYELID: Status: RESOLVED | Noted: 2020-07-07 | Resolved: 2020-09-11

## 2020-10-08 DIAGNOSIS — E78.5 HYPERLIPIDEMIA, UNSPECIFIED HYPERLIPIDEMIA TYPE: ICD-10-CM

## 2020-10-09 RX ORDER — ATORVASTATIN CALCIUM 10 MG/1
TABLET, FILM COATED ORAL
Qty: 45 TABLET | Refills: 1 | Status: SHIPPED | OUTPATIENT
Start: 2020-10-09 | End: 2021-03-30

## 2020-10-10 ENCOUNTER — APPOINTMENT (OUTPATIENT)
Dept: LAB | Age: 40
End: 2020-10-10
Attending: INTERNAL MEDICINE
Payer: COMMERCIAL

## 2020-10-10 ENCOUNTER — TELEPHONE (OUTPATIENT)
Dept: INTERNAL MEDICINE CLINIC | Facility: CLINIC | Age: 40
End: 2020-10-10

## 2020-10-10 DIAGNOSIS — R68.89 FLU-LIKE SYMPTOMS: ICD-10-CM

## 2020-10-10 NOTE — TELEPHONE ENCOUNTER
Wife and daughter has covid  Pt is coughing  Body aches feverish  COVID  Ordered    May take zince pepcid AC   Vit D  Tylenol prn   Already on ASA daily      10 Ways to [facility]      1.  Stay home from work, school, and away from other pub

## 2020-12-07 RX ORDER — PEN NEEDLE, DIABETIC 31 GX5/16"
NEEDLE, DISPOSABLE MISCELLANEOUS
Qty: 100 EACH | Refills: 3 | Status: SHIPPED | OUTPATIENT
Start: 2020-12-07 | End: 2022-02-07

## 2020-12-07 RX ORDER — METFORMIN HYDROCHLORIDE 500 MG/1
TABLET, EXTENDED RELEASE ORAL
Qty: 180 TABLET | Refills: 3 | Status: SHIPPED | OUTPATIENT
Start: 2020-12-07 | End: 2021-09-01

## 2021-01-04 RX ORDER — BLOOD SUGAR DIAGNOSTIC
STRIP MISCELLANEOUS
Qty: 200 STRIP | Refills: 3 | Status: SHIPPED | OUTPATIENT
Start: 2021-01-04 | End: 2021-01-13

## 2021-01-04 NOTE — TELEPHONE ENCOUNTER
Current Outpatient Medications:   ••  Glucose Blood (ACCU-CHEK ANANT PLUS) In Vitro Strip, Test glucose twice daily, Disp: 200 strip, Rfl: 3  •

## 2021-01-13 ENCOUNTER — TELEPHONE (OUTPATIENT)
Dept: INTERNAL MEDICINE CLINIC | Facility: CLINIC | Age: 41
End: 2021-01-13

## 2021-01-13 RX ORDER — BLOOD SUGAR DIAGNOSTIC
STRIP MISCELLANEOUS
Qty: 200 EACH | Refills: 3 | Status: SHIPPED | OUTPATIENT
Start: 2021-01-13

## 2021-02-01 ENCOUNTER — TELEPHONE (OUTPATIENT)
Dept: INTERNAL MEDICINE CLINIC | Facility: CLINIC | Age: 41
End: 2021-02-01

## 2021-03-30 DIAGNOSIS — E78.5 HYPERLIPIDEMIA, UNSPECIFIED HYPERLIPIDEMIA TYPE: ICD-10-CM

## 2021-03-30 RX ORDER — ATORVASTATIN CALCIUM 10 MG/1
TABLET, FILM COATED ORAL
Qty: 45 TABLET | Refills: 1 | Status: SHIPPED | OUTPATIENT
Start: 2021-03-30 | End: 2021-09-01

## 2021-05-03 RX ORDER — LIRAGLUTIDE 6 MG/ML
3 INJECTION, SOLUTION SUBCUTANEOUS DAILY
Qty: 15 PEN | Refills: 3 | Status: SHIPPED | OUTPATIENT
Start: 2021-05-03 | End: 2021-05-03

## 2021-05-03 RX ORDER — LIRAGLUTIDE 6 MG/ML
INJECTION, SOLUTION SUBCUTANEOUS
Refills: 3 | OUTPATIENT
Start: 2021-05-03

## 2021-05-31 RX ORDER — LISINOPRIL 5 MG/1
TABLET ORAL
Qty: 90 TABLET | Refills: 1 | Status: SHIPPED | OUTPATIENT
Start: 2021-05-31 | End: 2021-09-01

## 2021-09-01 ENCOUNTER — OFFICE VISIT (OUTPATIENT)
Dept: INTERNAL MEDICINE CLINIC | Facility: CLINIC | Age: 41
End: 2021-09-01
Payer: COMMERCIAL

## 2021-09-01 VITALS
DIASTOLIC BLOOD PRESSURE: 63 MMHG | HEIGHT: 73 IN | SYSTOLIC BLOOD PRESSURE: 100 MMHG | WEIGHT: 276 LBS | BODY MASS INDEX: 36.58 KG/M2 | HEART RATE: 72 BPM

## 2021-09-01 DIAGNOSIS — E66.09 CLASS 2 OBESITY DUE TO EXCESS CALORIES WITHOUT SERIOUS COMORBIDITY WITH BODY MASS INDEX (BMI) OF 37.0 TO 37.9 IN ADULT: ICD-10-CM

## 2021-09-01 DIAGNOSIS — Z00.00 ROUTINE GENERAL MEDICAL EXAMINATION AT A HEALTH CARE FACILITY: Primary | ICD-10-CM

## 2021-09-01 DIAGNOSIS — E78.5 HYPERLIPIDEMIA, UNSPECIFIED HYPERLIPIDEMIA TYPE: ICD-10-CM

## 2021-09-01 DIAGNOSIS — E11.9 DIABETES MELLITUS TYPE 2 WITHOUT RETINOPATHY (HCC): ICD-10-CM

## 2021-09-01 PROCEDURE — 3008F BODY MASS INDEX DOCD: CPT | Performed by: INTERNAL MEDICINE

## 2021-09-01 PROCEDURE — 99396 PREV VISIT EST AGE 40-64: CPT | Performed by: INTERNAL MEDICINE

## 2021-09-01 PROCEDURE — 3078F DIAST BP <80 MM HG: CPT | Performed by: INTERNAL MEDICINE

## 2021-09-01 PROCEDURE — 3074F SYST BP LT 130 MM HG: CPT | Performed by: INTERNAL MEDICINE

## 2021-09-01 RX ORDER — LISINOPRIL 5 MG/1
5 TABLET ORAL DAILY
Qty: 90 TABLET | Refills: 3 | Status: SHIPPED | OUTPATIENT
Start: 2021-09-01

## 2021-09-01 RX ORDER — ASPIRIN 81 MG/1
TABLET ORAL
Qty: 90 TABLET | Refills: 3 | Status: SHIPPED | OUTPATIENT
Start: 2021-09-01

## 2021-09-01 RX ORDER — METFORMIN HYDROCHLORIDE 500 MG/1
1000 TABLET, EXTENDED RELEASE ORAL 2 TIMES DAILY WITH MEALS
Qty: 360 TABLET | Refills: 3 | Status: SHIPPED | OUTPATIENT
Start: 2021-09-01

## 2021-09-01 RX ORDER — ATORVASTATIN CALCIUM 10 MG/1
5 TABLET, FILM COATED ORAL NIGHTLY
Qty: 90 TABLET | Refills: 3 | Status: SHIPPED | OUTPATIENT
Start: 2021-09-01

## 2021-09-01 RX ORDER — LIRAGLUTIDE 6 MG/ML
3 INJECTION, SOLUTION SUBCUTANEOUS DAILY
Qty: 15 EACH | Refills: 3 | Status: CANCELLED | OUTPATIENT
Start: 2021-09-01

## 2021-09-02 NOTE — PROGRESS NOTES
HPI:    Patient ID: Sabrina Dover is a 42year old male.     HPI    Physical exam    Diabetes  Blood sugar check dialy  BG as high as 100  Side effect from saxenda  Now and then  Low weight  /63 (BP Location: Left arm, Patient Position: Sitting, tablet 3   • aspirin 81 MG Oral Tab EC TAKE 1 TABLET DAILY 90 tablet 3   • metFORMIN HCl  MG Oral Tablet 24 Hr Take 2 tablets (1,000 mg total) by mouth 2 (two) times daily with meals.  TAKE 2 TABLETS BY MOUTH EVERY DAY WITH BREAKFAST 360 tablet 3   • equal, round, and reactive to light. Neck:      Thyroid: No thyromegaly. Cardiovascular:      Rate and Rhythm: Normal rate and regular rhythm. Heart sounds: Normal heart sounds. No murmur heard.      Pulmonary:      Effort: Pulmonary effort is norm eva  Follow up in 3 months    (E66.09,  Z68.36) Class 2 obesity due to excess calories without serious comorbidity with body mass index (BMI) of 36.0 to 3t.9 in adult  Plan: Body mass index is 36.41 kg/m².   PT lost weight      Medications and most rece

## 2021-09-09 ENCOUNTER — LAB ENCOUNTER (OUTPATIENT)
Dept: LAB | Age: 41
End: 2021-09-09
Attending: INTERNAL MEDICINE
Payer: COMMERCIAL

## 2021-09-09 DIAGNOSIS — E78.5 HYPERLIPIDEMIA, UNSPECIFIED HYPERLIPIDEMIA TYPE: ICD-10-CM

## 2021-09-09 DIAGNOSIS — E11.9 DIABETES MELLITUS TYPE 2 WITHOUT RETINOPATHY (HCC): ICD-10-CM

## 2021-09-09 LAB
ALBUMIN SERPL-MCNC: 3.6 G/DL (ref 3.4–5)
ALBUMIN/GLOB SERPL: 1 {RATIO} (ref 1–2)
ALP LIVER SERPL-CCNC: 76 U/L
ALT SERPL-CCNC: 29 U/L
ANION GAP SERPL CALC-SCNC: 8 MMOL/L (ref 0–18)
AST SERPL-CCNC: 11 U/L (ref 15–37)
BILIRUB SERPL-MCNC: 0.4 MG/DL (ref 0.1–2)
BUN BLD-MCNC: 17 MG/DL (ref 7–18)
BUN/CREAT SERPL: 19.8 (ref 10–20)
CALCIUM BLD-MCNC: 9 MG/DL (ref 8.5–10.1)
CHLORIDE SERPL-SCNC: 104 MMOL/L (ref 98–112)
CHOLEST SMN-MCNC: 181 MG/DL (ref ?–200)
CO2 SERPL-SCNC: 25 MMOL/L (ref 21–32)
CREAT BLD-MCNC: 0.86 MG/DL
DEPRECATED RDW RBC AUTO: 38.9 FL (ref 35.1–46.3)
ERYTHROCYTE [DISTWIDTH] IN BLOOD BY AUTOMATED COUNT: 12.1 % (ref 11–15)
EST. AVERAGE GLUCOSE BLD GHB EST-MCNC: 214 MG/DL (ref 68–126)
GLOBULIN PLAS-MCNC: 3.6 G/DL (ref 2.8–4.4)
GLUCOSE BLD-MCNC: 282 MG/DL (ref 70–99)
HBA1C MFR BLD HPLC: 9.1 % (ref ?–5.7)
HCT VFR BLD AUTO: 47.1 %
HDLC SERPL-MCNC: 35 MG/DL (ref 40–59)
HGB BLD-MCNC: 15.8 G/DL
LDLC SERPL CALC-MCNC: 93 MG/DL (ref ?–100)
M PROTEIN MFR SERPL ELPH: 7.2 G/DL (ref 6.4–8.2)
MCH RBC QN AUTO: 29.4 PG (ref 26–34)
MCHC RBC AUTO-ENTMCNC: 33.5 G/DL (ref 31–37)
MCV RBC AUTO: 87.7 FL
NONHDLC SERPL-MCNC: 146 MG/DL (ref ?–130)
OSMOLALITY SERPL CALC.SUM OF ELEC: 296 MOSM/KG (ref 275–295)
PATIENT FASTING Y/N/NP: YES
PATIENT FASTING Y/N/NP: YES
PLATELET # BLD AUTO: 221 10(3)UL (ref 150–450)
POTASSIUM SERPL-SCNC: 3.9 MMOL/L (ref 3.5–5.1)
PROT UR-MCNC: 16.3 MG/DL
RBC # BLD AUTO: 5.37 X10(6)UL
SODIUM SERPL-SCNC: 137 MMOL/L (ref 136–145)
T4 FREE SERPL-MCNC: 1.1 NG/DL (ref 0.8–1.7)
TRIGL SERPL-MCNC: 318 MG/DL (ref 30–149)
TSI SER-ACNC: 2.38 MIU/ML (ref 0.36–3.74)
VLDLC SERPL CALC-MCNC: 53 MG/DL (ref 0–30)
WBC # BLD AUTO: 6.5 X10(3) UL (ref 4–11)

## 2021-09-09 PROCEDURE — 83036 HEMOGLOBIN GLYCOSYLATED A1C: CPT

## 2021-09-09 PROCEDURE — 84443 ASSAY THYROID STIM HORMONE: CPT

## 2021-09-09 PROCEDURE — 80061 LIPID PANEL: CPT

## 2021-09-09 PROCEDURE — 81015 MICROSCOPIC EXAM OF URINE: CPT

## 2021-09-09 PROCEDURE — 36415 COLL VENOUS BLD VENIPUNCTURE: CPT

## 2021-09-09 PROCEDURE — 84439 ASSAY OF FREE THYROXINE: CPT

## 2021-09-09 PROCEDURE — 84156 ASSAY OF PROTEIN URINE: CPT

## 2021-09-09 PROCEDURE — 85027 COMPLETE CBC AUTOMATED: CPT

## 2021-09-09 PROCEDURE — 80053 COMPREHEN METABOLIC PANEL: CPT

## 2021-09-09 PROCEDURE — 3046F HEMOGLOBIN A1C LEVEL >9.0%: CPT | Performed by: INTERNAL MEDICINE

## 2021-11-10 RX ORDER — LIRAGLUTIDE 6 MG/ML
3 INJECTION, SOLUTION SUBCUTANEOUS DAILY
Qty: 3 EACH | Refills: 1 | Status: SHIPPED | OUTPATIENT
Start: 2021-11-10 | End: 2022-03-02

## 2021-11-10 NOTE — TELEPHONE ENCOUNTER
Please review refill protocol failed/ no protocol  Requested Prescriptions   Pending Prescriptions Disp Refills    SAXENDA 18 MG/3ML Subcutaneous Solution Pen-injector [Pharmacy Med Name: SAXENDA 18 MG/3 ML PEN]  1     Sig: Inject 3 mg into the skin daily.         There is no refill protocol information for this order

## 2021-12-21 ENCOUNTER — OFFICE VISIT (OUTPATIENT)
Dept: INTERNAL MEDICINE CLINIC | Facility: CLINIC | Age: 41
End: 2021-12-21
Payer: COMMERCIAL

## 2021-12-21 ENCOUNTER — LAB ENCOUNTER (OUTPATIENT)
Dept: LAB | Age: 41
End: 2021-12-21
Attending: INTERNAL MEDICINE
Payer: COMMERCIAL

## 2021-12-21 VITALS
WEIGHT: 272 LBS | BODY MASS INDEX: 35.28 KG/M2 | DIASTOLIC BLOOD PRESSURE: 75 MMHG | HEART RATE: 77 BPM | HEIGHT: 73.5 IN | SYSTOLIC BLOOD PRESSURE: 123 MMHG

## 2021-12-21 DIAGNOSIS — E78.2 MIXED HYPERLIPIDEMIA: ICD-10-CM

## 2021-12-21 DIAGNOSIS — R10.32 LLQ PAIN: Primary | ICD-10-CM

## 2021-12-21 DIAGNOSIS — E11.9 DIABETES MELLITUS TYPE 2 WITHOUT RETINOPATHY (HCC): ICD-10-CM

## 2021-12-21 DIAGNOSIS — G47.33 OSA (OBSTRUCTIVE SLEEP APNEA): ICD-10-CM

## 2021-12-21 DIAGNOSIS — R10.32 LLQ PAIN: ICD-10-CM

## 2021-12-21 PROCEDURE — 80048 BASIC METABOLIC PNL TOTAL CA: CPT

## 2021-12-21 PROCEDURE — 3074F SYST BP LT 130 MM HG: CPT | Performed by: INTERNAL MEDICINE

## 2021-12-21 PROCEDURE — 83036 HEMOGLOBIN GLYCOSYLATED A1C: CPT

## 2021-12-21 PROCEDURE — 36415 COLL VENOUS BLD VENIPUNCTURE: CPT

## 2021-12-21 PROCEDURE — 85652 RBC SED RATE AUTOMATED: CPT

## 2021-12-21 PROCEDURE — 85025 COMPLETE CBC W/AUTO DIFF WBC: CPT

## 2021-12-21 PROCEDURE — 3052F HG A1C>EQUAL 8.0%<EQUAL 9.0%: CPT | Performed by: INTERNAL MEDICINE

## 2021-12-21 PROCEDURE — 99214 OFFICE O/P EST MOD 30 MIN: CPT | Performed by: INTERNAL MEDICINE

## 2021-12-21 PROCEDURE — 3008F BODY MASS INDEX DOCD: CPT | Performed by: INTERNAL MEDICINE

## 2021-12-21 PROCEDURE — 81003 URINALYSIS AUTO W/O SCOPE: CPT

## 2021-12-21 PROCEDURE — 3078F DIAST BP <80 MM HG: CPT | Performed by: INTERNAL MEDICINE

## 2021-12-22 NOTE — PROGRESS NOTES
HPI:    Patient ID: Reba Cruz is a 42year old male.     HPI    LLQ about 2 to 3 wks ago  Off and on deep ache not related to any other symptoms  Deep pain  Gradually improved  Today asymptomatic  Pt with diabetes   Loosing weight still having tro 1 tablet (5 mg total) by mouth daily. 90 tablet 3   • atorvastatin 10 MG Oral Tab Take 0.5 tablets (5 mg total) by mouth nightly.  90 tablet 3   • aspirin 81 MG Oral Tab EC TAKE 1 TABLET DAILY 90 tablet 3   • metFORMIN HCl  MG Oral Tablet 24 Hr Take 2 CVA tenderness, left CVA tenderness, guarding or rebound. Hernia: No hernia is present. Skin:     General: Skin is warm and dry. Neurological:      Mental Status: He is alert and oriented to person, place, and time.       Deep Tendon Reflexes: Refl

## 2022-02-07 RX ORDER — FLURBIPROFEN SODIUM 0.3 MG/ML
SOLUTION/ DROPS OPHTHALMIC
Qty: 100 EACH | Refills: 3 | Status: SHIPPED | OUTPATIENT
Start: 2022-02-07

## 2022-02-07 NOTE — TELEPHONE ENCOUNTER
Refill passed per [facility] protocol.     Requested Prescriptions   Pending Prescriptions Disp Refills    BD PEN NEEDLE MINI U/F 31G X 5 MM Does not apply Misc [Pharmacy Med Name: BD UF MINI PEN NEEDLE [id number]]  3     Sig: USE AS DIRECTED        Diabetic Supplies Protocol Passed - 2/7/2022  1:12 PM        Passed - Appointment in past 12 or next 3 months                  Recent Outpatient Visits              1 month ago LLQ pain    Cathi Fleischer, MD    Office Visit    5 months ago Routine general medical examination at a health care facility    [facility], [facility], [facility] MD Precious    Office Visit    1 year ago Hyperlipidemia, unspecified hyperlipidemia type    [facility], [place] [address], Vicenta Butcher MD    Office Visit    1 year ago Hordeolum externum of right upper eyelid    [address] [place] [address] Drive, [facility], APRN    Office Visit    1 year ago Viral respiratory infection    [address]    Office Visit

## 2022-02-07 NOTE — TELEPHONE ENCOUNTER
Refill passed per [facility] protocol.     Requested Prescriptions   Pending Prescriptions Disp Refills    BD PEN NEEDLE MINI U/F 31G X 5 MM Does not apply Misc [Pharmacy Med Name: BD UF MINI PEN NEEDLE [id number]]  3     Sig: USE AS DIRECTED        Diabetic Supplies Protocol Passed - 2/7/2022  1:12 PM        Passed - Appointment in past 12 or next 3 months                  Recent Outpatient Visits              1 month ago LLQ pain    Cathi Fleischer, MD    Office Visit    5 months ago Routine general medical examination at a health care facility    [facility], [address], [address] [facility] MD Precious    Office Visit    1 year ago Hyperlipidemia, unspecified hyperlipidemia type    [facility], [address], Vicenta Butcher MD    Office Visit    1 year ago Hordeolum externum of right upper eyelid    [address] [place] [address] Drive, [facility], APRN    Office Visit    1 year ago Viral respiratory infection    [address]    Office Visit

## 2022-03-01 RX ORDER — ASPIRIN 81 MG/1
TABLET ORAL
Qty: 90 TABLET | Refills: 3 | OUTPATIENT
Start: 2022-03-01

## 2022-03-02 ENCOUNTER — OFFICE VISIT (OUTPATIENT)
Dept: ENDOCRINOLOGY CLINIC | Facility: CLINIC | Age: 42
End: 2022-03-02
Payer: COMMERCIAL

## 2022-03-02 VITALS
HEIGHT: 73.5 IN | HEART RATE: 78 BPM | BODY MASS INDEX: 34.5 KG/M2 | WEIGHT: 266 LBS | SYSTOLIC BLOOD PRESSURE: 116 MMHG | DIASTOLIC BLOOD PRESSURE: 73 MMHG

## 2022-03-02 DIAGNOSIS — E11.65 UNCONTROLLED TYPE 2 DIABETES MELLITUS WITH HYPERGLYCEMIA (HCC): Primary | ICD-10-CM

## 2022-03-02 LAB
CARTRIDGE LOT#: ABNORMAL NUMERIC
GLUCOSE BLOOD: 245
HEMOGLOBIN A1C: 8.6 % (ref 4.3–5.6)
TEST STRIP LOT #: NORMAL NUMERIC

## 2022-03-02 PROCEDURE — 36416 COLLJ CAPILLARY BLOOD SPEC: CPT | Performed by: NURSE PRACTITIONER

## 2022-03-02 PROCEDURE — 3074F SYST BP LT 130 MM HG: CPT | Performed by: NURSE PRACTITIONER

## 2022-03-02 PROCEDURE — 83036 HEMOGLOBIN GLYCOSYLATED A1C: CPT | Performed by: NURSE PRACTITIONER

## 2022-03-02 PROCEDURE — 99204 OFFICE O/P NEW MOD 45 MIN: CPT | Performed by: NURSE PRACTITIONER

## 2022-03-02 PROCEDURE — 82947 ASSAY GLUCOSE BLOOD QUANT: CPT | Performed by: NURSE PRACTITIONER

## 2022-03-02 PROCEDURE — 3078F DIAST BP <80 MM HG: CPT | Performed by: NURSE PRACTITIONER

## 2022-03-02 PROCEDURE — 3008F BODY MASS INDEX DOCD: CPT | Performed by: NURSE PRACTITIONER

## 2022-03-02 RX ORDER — DULAGLUTIDE 0.75 MG/.5ML
0.75 INJECTION, SOLUTION SUBCUTANEOUS WEEKLY
Qty: 2 ML | Refills: 0 | Status: SHIPPED | OUTPATIENT
Start: 2022-03-02 | End: 2022-03-29

## 2022-03-02 NOTE — PATIENT INSTRUCTIONS
A1C:8.6% today --> slight increase from 8.4% on 12/21/2021  Blood glucose: 245 in clinic today    Medications:   -continue with Metformin 1,000mg twice daily   -start Trulicity 1.MC once weekly   Common side effects:    Nausea or diarrhea    These effects usually go away over time as your body gets used to the medicine      Here are some things that might help your nausea go away:   Eat small amounts of food instrad of few large meals   Eat plain, bland non greasy foods    Drink plenty of fluids (sugar free)    Avoid foods and smells that might make you sick    Eat slowly and listen to your hunger    -lets work on limiting carbohydrates to 60gm per meal/ max 180gm per day  -avoid eating snacks between meals  -avoid eating sweets or soda/fruit juices or Gatorade or energy drinks   -eat dinner at least 2 hours prior to going to bed at night  - increase (aerobic) exercise to at least 4-5 days per week for at least 30 mins each session    -avoid going more than 2 consecutive days of no exercise    Please call or send me a [facility] msg with an update on how you are feeling and your blood glucose readings in 3-4 weeks. Weight:   Wt Readings from Last 6 Encounters:  03/02/22 : 266 lb (120.7 kg)  02/23/22 : 268 lb (121.6 kg)  12/21/21 : 272 lb (123.4 kg)  09/01/21 : 276 lb (125.2 kg)  09/02/20 : 290 lb (131.5 kg)  07/07/20 : 290 lb (131.5 kg)    A1C goal:   <7.0%    Blood sugar testing:  Test your blood sugar 1 time daily   Recommended times to test: Before breakfast (fasting) or  2hrs after meals     Blood sugar targets:  Before breakfast:   (preferably < 110)  Before meals OR 2 hours after meals: <150    Call for persistent blood sugars < 75 or > 200.

## 2022-03-31 RX ORDER — DULAGLUTIDE 0.75 MG/.5ML
0.75 INJECTION, SOLUTION SUBCUTANEOUS WEEKLY
Qty: 6 ML | Refills: 0 | Status: SHIPPED | OUTPATIENT
Start: 2022-03-31

## 2022-05-18 ENCOUNTER — MED REC SCAN ONLY (OUTPATIENT)
Dept: INTERNAL MEDICINE CLINIC | Facility: CLINIC | Age: 42
End: 2022-05-18

## 2022-05-19 DIAGNOSIS — E78.5 HYPERLIPIDEMIA, UNSPECIFIED HYPERLIPIDEMIA TYPE: ICD-10-CM

## 2022-05-19 RX ORDER — LISINOPRIL 5 MG/1
5 TABLET ORAL DAILY
Qty: 90 TABLET | Refills: 1 | Status: SHIPPED | OUTPATIENT
Start: 2022-05-19 | End: 2022-12-03

## 2022-05-19 RX ORDER — PERPHENAZINE 16 MG/1
TABLET, FILM COATED ORAL
Qty: 200 EACH | Refills: 3 | Status: SHIPPED | OUTPATIENT
Start: 2022-05-19 | End: 2023-11-16

## 2022-05-19 RX ORDER — ATORVASTATIN CALCIUM 10 MG/1
5 TABLET, FILM COATED ORAL NIGHTLY
Qty: 90 TABLET | Refills: 1 | Status: SHIPPED | OUTPATIENT
Start: 2022-05-19

## 2022-05-19 RX ORDER — ASPIRIN 81 MG/1
TABLET ORAL
Qty: 90 TABLET | Refills: 1 | Status: SHIPPED | OUTPATIENT
Start: 2022-05-19 | End: 2022-12-03

## 2022-05-19 NOTE — TELEPHONE ENCOUNTER
Refill passed per [facility] protocol. Requested Prescriptions   Pending Prescriptions Disp Refills    Glucose Blood (CONTOUR NEXT TEST) In Vitro Strip 200 each 3     Sig: Test twice a day        Diabetic Supplies Protocol Passed - 5/19/2022  9:21 AM        Passed - Appointment in past 12 or next 3 months           lisinopril 5 MG Oral Tab 90 tablet 3     Sig: Take 1 tablet (5 mg total) by mouth daily. Hypertensive Medications Protocol Passed - 5/19/2022  9:21 AM        Passed - CMP or BMP in past 12 months        Passed - Appointment in past 6 or next 3 months        Passed - GFR Non- > 50     Lab Results   Component Value Date    GFRNAA 109 12/21/2021                    atorvastatin 10 MG Oral Tab 90 tablet 3     Sig: Take 0.5 tablets (5 mg total) by mouth nightly.         Cholesterol Medication Protocol Passed - 5/19/2022  9:21 AM        Passed - ALT in past 12 months        Passed - LDL in past 12 months        Passed - Last ALT < 80       Lab Results   Component Value Date    ALT 29 09/09/2021             Passed - Last LDL < 130     Lab Results   Component Value Date    LDL 93 09/09/2021               Passed - Appointment in past 12 or next 3 months           aspirin 81 MG Oral Tab EC 90 tablet 3     Sig: TAKE 1 TABLET DAILY        Aspirin Protocol Passed - 5/19/2022  9:21 AM        Passed - Appointment in past 6 or next 3 months                Recent Outpatient Visits              2 months ago Uncontrolled type 2 diabetes mellitus with hyperglycemia [facility])    [facility], [address], [address] [place] [address]    Office Visit    4 months ago LLQ pain    Vlad Hahn MD    Office Visit    8 months ago Routine general medical examination at a health care facility    [facility], [address], Robert Dumont MD    Office Visit    1 year ago Hyperlipidemia, unspecified hyperlipidemia type    [address] Carlton Redd MD    Office Visit    1 year ago Hordeolum externum of right upper eyelid    CI[facility] Petit    Office Visit

## 2022-05-31 DIAGNOSIS — E78.5 HYPERLIPIDEMIA, UNSPECIFIED HYPERLIPIDEMIA TYPE: ICD-10-CM

## 2022-05-31 RX ORDER — LISINOPRIL 5 MG/1
5 TABLET ORAL DAILY
Qty: 90 TABLET | Refills: 1 | OUTPATIENT
Start: 2022-05-31

## 2022-05-31 RX ORDER — ATORVASTATIN CALCIUM 10 MG/1
5 TABLET, FILM COATED ORAL NIGHTLY
Qty: 90 TABLET | Refills: 1 | OUTPATIENT
Start: 2022-05-31

## 2022-05-31 RX ORDER — ASPIRIN 81 MG/1
TABLET ORAL
Qty: 90 TABLET | Refills: 1 | OUTPATIENT
Start: 2022-05-31

## 2022-06-02 RX ORDER — DULAGLUTIDE 0.75 MG/.5ML
0.75 INJECTION, SOLUTION SUBCUTANEOUS WEEKLY
Qty: 6 ML | Refills: 0 | Status: CANCELLED | OUTPATIENT
Start: 2022-06-02

## 2022-06-02 RX ORDER — DULAGLUTIDE 0.75 MG/.5ML
0.75 INJECTION, SOLUTION SUBCUTANEOUS WEEKLY
Qty: 6 ML | Refills: 0 | Status: SHIPPED | OUTPATIENT
Start: 2022-06-02

## 2022-06-02 NOTE — TELEPHONE ENCOUNTER
Requesting Trulicity 7.65RS/PGVIVD which has already been filled earlier today by Timothy SAMUEL.  Refer to Refill encoutner 5/31/2022

## 2022-06-21 RX ORDER — DULAGLUTIDE 0.75 MG/.5ML
0.75 INJECTION, SOLUTION SUBCUTANEOUS WEEKLY
Qty: 6 ML | Refills: 0 | Status: SHIPPED | OUTPATIENT
Start: 2022-06-21

## 2022-06-21 NOTE — TELEPHONE ENCOUNTER
LOV 3/2/2022    RTC in 2 months     F/U not scheduled at this time; [employer] message sent.      Orders pending

## 2022-06-30 ENCOUNTER — TELEPHONE (OUTPATIENT)
Dept: SURGERY | Facility: CLINIC | Age: 42
End: 2022-06-30

## 2022-06-30 NOTE — TELEPHONE ENCOUNTER
Dr. Germain Jenkins please advise do you need to see pt in the office again  to discuss vasectomy  LOV 2/11/2020  Or can I just schedule pt for procedure?  please advise

## 2022-06-30 NOTE — TELEPHONE ENCOUNTER
Per pt had a consult in 2020 for a vasectomy and states due to covid he did not proceed. Per pt asking if he can schedule the vasectomy.  Please advise

## 2022-08-11 ENCOUNTER — OFFICE VISIT (OUTPATIENT)
Dept: SURGERY | Facility: CLINIC | Age: 42
End: 2022-08-11
Payer: COMMERCIAL

## 2022-08-11 VITALS
HEIGHT: 73 IN | BODY MASS INDEX: 34.85 KG/M2 | SYSTOLIC BLOOD PRESSURE: 127 MMHG | WEIGHT: 263 LBS | HEART RATE: 75 BPM | DIASTOLIC BLOOD PRESSURE: 78 MMHG

## 2022-08-11 DIAGNOSIS — Z30.09 VASECTOMY EVALUATION: Primary | ICD-10-CM

## 2022-08-11 PROCEDURE — 3078F DIAST BP <80 MM HG: CPT | Performed by: UROLOGY

## 2022-08-11 PROCEDURE — 99214 OFFICE O/P EST MOD 30 MIN: CPT | Performed by: UROLOGY

## 2022-08-11 PROCEDURE — 3008F BODY MASS INDEX DOCD: CPT | Performed by: UROLOGY

## 2022-08-11 PROCEDURE — 3074F SYST BP LT 130 MM HG: CPT | Performed by: UROLOGY

## 2022-08-11 RX ORDER — DIAZEPAM 5 MG/1
15 TABLET ORAL ONCE
Qty: 3 TABLET | Refills: 0 | Status: SHIPPED | OUTPATIENT
Start: 2022-08-11 | End: 2022-08-11

## 2022-08-11 RX ORDER — HYDROCODONE BITARTRATE AND ACETAMINOPHEN 5; 325 MG/1; MG/1
2 TABLET ORAL
Qty: 2 TABLET | Refills: 0 | Status: SHIPPED | OUTPATIENT
Start: 2022-08-11 | End: 2022-08-11

## 2022-08-25 RX ORDER — METFORMIN HYDROCHLORIDE 500 MG/1
1000 TABLET, EXTENDED RELEASE ORAL 2 TIMES DAILY WITH MEALS
Qty: 360 TABLET | Refills: 1 | Status: SHIPPED | OUTPATIENT
Start: 2022-08-25

## 2022-08-26 NOTE — TELEPHONE ENCOUNTER
Please review refill failed/no protocol     Requested Prescriptions     Pending Prescriptions Disp Refills    METFORMIN  MG Oral Tablet 24 Hr [Pharmacy Med Name: METFORMIN HCL  MG TABLET] 360 tablet 3     Sig: TAKE 2 TABLETS BY MOUTH TWICE A DAY WITH MEALS         Recent Visits  Date Type Provider Dept   12/21/21 Office Visit MD [facility]-Internal [facility]   09/01/21 Office Visit Rose Marie Pérez MD [facility]   Showing recent visits within past 540 days with a meds authorizing provider and meeting all other requirements  Future Appointments  No visits were found meeting these conditions.   Showing future appointments within next 150 days with a meds authorizing provider and meeting all other requirements    Requested Prescriptions   Pending Prescriptions Disp Refills    METFORMIN  MG Oral Tablet 24 Hr [Pharmacy Med Name: METFORMIN HCL  MG TABLET] 360 tablet 3     Sig: TAKE 2 TABLETS BY MOUTH TWICE A DAY WITH MEALS        Diabetes Medication Protocol Failed - 8/25/2022 12:02 AM        Failed - Last A1C < 7.5 and within past 6 months     Lab Results   Component Value Date    A1C 8.6 (A) 03/02/2022               Failed - In person appointment or virtual visit in the past 6 mos or appointment in next 3 mos       Recent Outpatient Visits              2 weeks ago Vasectomy evaluation    [facility] Jana Selby MD    Office Visit    5 months ago Uncontrolled type 2 diabetes mellitus with hyperglycemia [facility])    [facility] , [address] [place] [address], [address] [facility], [facility]    Office Visit    8 months ago LLQ pain    Rochelle Aparicio MD    Office Visit    11 months ago Routine general medical examination at a health care facility    [facility], [facility] Joya Steel MD    Office Visit    1 year ago Hyperlipidemia, unspecified hyperlipidemia type    [address] Nicky Cameron MD    Office Visit     Future Appointments         Provider Department Appt Notes    In 2 months Barbara Pop MD [facility], [address] vas per [employer]                Failed - GFR in the past 12 months        Passed - GFR > 50     No results found for: [facility]                  Future Appointments         Provider Department Appt Notes    In 2 months Barbara Pop MD [facility], [address] vas per [employer]           Recent Outpatient Visits              2 weeks ago [facility]    [facility]    Office Visit    5 months ago Uncontrolled type 2 diabetes mellitus with hyperglycemia [facility], [facility], [facility], [address] [place] [address], [address] [place] [address], [facility]    Office Visit    8 months ago LLQ pain    Severino Mosqueda MD    Office Visit    11 months ago Routine general medical examination at a health care facility    [facility] [facility], [employer], [facility], Kishor Simons MD    Office Visit    1 year ago Hyperlipidemia, unspecified hyperlipidemia type    Kishor Galarza MD    Office Visit No

## 2022-09-20 ENCOUNTER — TELEPHONE (OUTPATIENT)
Dept: INTERNAL MEDICINE CLINIC | Facility: CLINIC | Age: 42
End: 2022-09-20

## 2022-09-20 DIAGNOSIS — G47.33 OBSTRUCTIVE SLEEP APNEA: Primary | ICD-10-CM

## 2022-09-20 NOTE — TELEPHONE ENCOUNTER
Received fax from [facility] for the following CPAP supplies:     - Cpap/Apap   - Full face mask    - Full face cushion    - Nasal Mask    - Nasal Cushion    - Nasal Pillow    - Headgear    - Chinstrap    - Non heated Tubing    - Disposable Filter    - Reusable Filter    - Water Chamber    - Heated Tubing     DX code : G47.33     Please fax auth to [phone] , phone # [phone] .

## 2022-09-22 NOTE — TELEPHONE ENCOUNTER
Good Afternoon Dr Sariah Coulter and staff,    Please sign off on pended DME order. Once signed off I can then submit to [facility] for review for CPAP supplies.     Thank you    Carlos Gonzalez

## 2022-09-30 ENCOUNTER — TELEPHONE (OUTPATIENT)
Dept: INTERNAL MEDICINE CLINIC | Facility: CLINIC | Age: 42
End: 2022-09-30

## 2022-09-30 NOTE — TELEPHONE ENCOUNTER
Patient notified that he is due for physical exam w/PCP. He states that he will call back to schedule.

## 2022-10-03 ENCOUNTER — PATIENT MESSAGE (OUTPATIENT)
Dept: CASE MANAGEMENT | Age: 42
End: 2022-10-03

## 2022-10-10 ENCOUNTER — TELEPHONE (OUTPATIENT)
Dept: SURGERY | Facility: CLINIC | Age: 42
End: 2022-10-10

## 2022-10-12 NOTE — TELEPHONE ENCOUNTER
Called pt's spouse back and had to LM informing her that I need to s/w pt to cxl and reschd the procedure.

## 2022-10-13 NOTE — TELEPHONE ENCOUNTER
S/W pt and reschd his VAS procedure from 10/27 til thurs.  1/5/23 at 1 pm and I told pt to make sure he arrives at 12:30 pm.

## 2022-11-09 ENCOUNTER — HOSPITAL ENCOUNTER (OUTPATIENT)
Age: 42
Discharge: HOME | End: 2022-11-09
Payer: COMMERCIAL

## 2022-11-09 VITALS
OXYGEN SATURATION: 98 % | SYSTOLIC BLOOD PRESSURE: 125 MMHG | RESPIRATION RATE: 18 BRPM | HEIGHT: 73 IN | HEART RATE: 75 BPM | DIASTOLIC BLOOD PRESSURE: 74 MMHG | TEMPERATURE: 97 F | BODY MASS INDEX: 35.78 KG/M2 | WEIGHT: 270 LBS

## 2022-11-09 DIAGNOSIS — H02.89 PAIN AND SWELLING OF EYELID OF RIGHT EYE: Primary | ICD-10-CM

## 2022-11-09 DIAGNOSIS — H02.843 PAIN AND SWELLING OF EYELID OF RIGHT EYE: Primary | ICD-10-CM

## 2022-11-09 PROCEDURE — 99213 OFFICE O/P EST LOW 20 MIN: CPT | Performed by: NURSE PRACTITIONER

## 2022-11-09 RX ORDER — TOBRAMYCIN AND DEXAMETHASONE 3; 1 MG/ML; MG/ML
2 SUSPENSION/ DROPS OPHTHALMIC
Qty: 10 ML | Refills: 0 | Status: SHIPPED | OUTPATIENT
Start: 2022-11-09 | End: 2022-11-14

## 2022-11-09 NOTE — DISCHARGE INSTRUCTIONS
Use the eyedrops as directed. Warm compresses. Try an antihistamine such as Claritin, Allegra, Zyrtec.   Follow-up with ophthalmology if no improvement

## 2022-11-09 NOTE — ED INITIAL ASSESSMENT (HPI)
Pt presents with redness and swelling on right upper eyelid x 4 days, denies vision change or eye pain.

## 2022-11-18 NOTE — TELEPHONE ENCOUNTER
Chris Parikh from [facility] requesting copy of referral order for cpap supplies sent to the following fax number,    Fax# [phone]

## 2022-11-23 NOTE — TELEPHONE ENCOUNTER
Copy of referral for CPAP supplies faxed to [facility] [phone]  (confirmation received) . The patient is a 56y Female complaining of arm pain/injury.

## 2022-11-25 RX ORDER — DULAGLUTIDE 0.75 MG/.5ML
0.75 INJECTION, SOLUTION SUBCUTANEOUS WEEKLY
Qty: 2 ML | Refills: 0 | Status: SHIPPED | OUTPATIENT
Start: 2022-11-25

## 2022-11-25 NOTE — TELEPHONE ENCOUNTER
LOV: 3/2/22  RTC 2 months  Refilled for 30 days per protocol. RN sent [employer] message to make an appointment.

## 2022-12-03 RX ORDER — LISINOPRIL 5 MG/1
5 TABLET ORAL DAILY
Qty: 90 TABLET | Refills: 1 | Status: SHIPPED | OUTPATIENT
Start: 2022-12-03

## 2022-12-03 RX ORDER — ASPIRIN 81 MG/1
TABLET ORAL
Qty: 90 TABLET | Refills: 1 | Status: SHIPPED | OUTPATIENT
Start: 2022-12-03

## 2022-12-13 RX ORDER — DULAGLUTIDE 0.75 MG/.5ML
0.75 INJECTION, SOLUTION SUBCUTANEOUS WEEKLY
Qty: 6 ML | Refills: 0 | Status: SHIPPED | OUTPATIENT
Start: 2022-12-13

## 2022-12-13 NOTE — TELEPHONE ENCOUNTER
LOV 03/02/22. RTC 2 months. No f/u. Called to schedule f/u. Patient states he will call back to schedule. Advised importance of following up in clinic. Patient verbalized understanding. Pended 3 month supply.

## 2023-02-27 RX ORDER — METFORMIN HYDROCHLORIDE 500 MG/1
1000 TABLET, EXTENDED RELEASE ORAL 2 TIMES DAILY WITH MEALS
Qty: 360 TABLET | Refills: 0 | Status: SHIPPED | OUTPATIENT
Start: 2023-02-27

## 2023-02-28 RX ORDER — ASPIRIN 81 MG/1
TABLET ORAL
Qty: 90 TABLET | Refills: 1 | Status: SHIPPED | OUTPATIENT
Start: 2023-02-28

## 2023-02-28 RX ORDER — LISINOPRIL 5 MG/1
5 TABLET ORAL DAILY
Qty: 90 TABLET | Refills: 1 | Status: SHIPPED | OUTPATIENT
Start: 2023-02-28

## 2023-02-28 NOTE — TELEPHONE ENCOUNTER
Please review. Protocol failed/ No protocol      Requested Prescriptions   Pending Prescriptions Disp Refills    lisinopril 5 MG Oral Tab 90 tablet 1     Sig: Take 1 tablet (5 mg total) by mouth daily. Hypertensive Medications Protocol Failed - 2/28/2023  7:20 AM        Failed - In person appointment in the past 12 or next 3 months     Recent Outpatient Visits              6 months ago Vasectomy evaluation    Kit Ward MD    Office Visit    12 months ago Uncontrolled type 2 diabetes mellitus with hyperglycemia [facility])    [address], [place], APRN    Office Visit    1 year ago LLQ pain    Alex Barnett MD    Office Visit    1 year ago Routine general medical examination at a health care facility    Tania Ward MD    Office Visit    2 years ago Hyperlipidemia, unspecified hyperlipidemia type    Augusta Jones MD    Office Visit                      Failed - CMP or BMP in past 6 months     No results found for this or any previous visit (from the past 4392 hour(s)).             Failed - In person appointment or virtual visit in the past 6 months     Recent Outpatient Visits              6 months ago Vasectomy evaluation    Mega Fuller MD    Office Visit    12 months ago Uncontrolled type 2 diabetes mellitus with hyperglycemia [facility])    [address], [address] [facility], [address], APRREAL    Office Visit    1 year ago LLQ pain    Alex Barnett MD    Office Visit    1 year ago Routine general medical examination at a health care facility    [address], [address] [facility], MD    Office Visit    2 years ago Hyperlipidemia, unspecified hyperlipidemia type    Placido Keyes MD    Office Visit                      Failed - EGFRCR or GFRNAA > 50     GFR Evaluation            Passed - Last BP reading less than 140/90     BP Readings from Last 1 Encounters:  11/09/22 : 125/74                aspirin 81 MG Oral Tab EC 90 tablet 1     Sig: TAKE 1 TABLET BY MOUTH EVERY DAY       Aspirin Protocol Failed - 2/28/2023  7:20 AM        Failed - In person appointment or virtual visit in the past 6 mos or appointment in next 3 mos     Recent Outpatient Visits              6 months ago Vasectomy evaluation    January Rice MD    Office Visit    12 months ago Uncontrolled type 2 diabetes mellitus with hyperglycemia [facility])    Ayden Rice [facility], CIELO    Office Visit    1 year ago LLQ pain    Bar Carvajal MD    Office Visit    1 year ago Routine general medical examination at a health care facility    John Rice MD    Office Visit    2 years ago Hyperlipidemia, unspecified hyperlipidemia type    John Rice MD    Office Visit                                Recent Outpatient Visits              6 months ago Vasectomy evaluation    January Rice MD    Office Visit    12 months ago Uncontrolled type 2 diabetes mellitus with hyperglycemia [facility])    [address] [facility] [place], [address], [address] [place] [address] [address] [place] [address], APRN    Office Visit    1 year ago LLQ pain    Bar Carvajal MD    Office Visit    1 year ago Routine general medical examination at a health care facility Shira Richard MD    Office Visit    2 years ago Hyperlipidemia, unspecified hyperlipidemia type    Alexx Stanley, Robert uDmont MD    Office Visit

## 2023-02-28 NOTE — TELEPHONE ENCOUNTER
Please review. Protocol failed / No Protocol.     Requested Prescriptions   Pending Prescriptions Disp Refills    METFORMIN  MG Oral Tablet 24 Hr [Pharmacy Med Name: METFORMIN HCL  MG TABLET] 360 tablet 1     Sig: TAKE 2 TABLETS BY MOUTH TWICE A DAY WITH MEALS       Diabetes Medication Protocol Failed - 2/26/2023 12:25 AM        Failed - Last A1C < 7.5 and within past 6 months     Lab Results   Component Value Date    A1C 8.6 (A) 03/02/2022             Failed - In person appointment or virtual visit in the past 6 mos or appointment in next 3 mos     Recent Outpatient Visits              6 months ago Vasectomy evaluation    [address], Shari Hi MD    Office Visit    12 months ago Uncontrolled type 2 diabetes mellitus with hyperglycemia [facility])    [address], CIELO Peña    Office Visit    1 year ago LLQ pain    Krishnappkiran Rivera MD    Office Visit    1 year ago Routine general medical examination at a health care facility    [address], Kishor Simons MD    Office Visit    2 years ago Hyperlipidemia, unspecified hyperlipidemia type    Werner Jaimes MD    Office Visit                      Failed - EGFRCR or GFRNAA > 50     GFR Evaluation            Failed - GFR in the past 12 months

## 2023-03-05 ENCOUNTER — PATIENT MESSAGE (OUTPATIENT)
Dept: INTERNAL MEDICINE CLINIC | Facility: CLINIC | Age: 43
End: 2023-03-05

## 2023-03-05 RX ORDER — DULAGLUTIDE 0.75 MG/.5ML
0.75 INJECTION, SOLUTION SUBCUTANEOUS WEEKLY
Qty: 2 ML | Refills: 0 | Status: CANCELLED | OUTPATIENT
Start: 2023-03-05

## 2023-03-06 RX ORDER — ASPIRIN 81 MG/1
TABLET ORAL
Qty: 90 TABLET | Refills: 1 | OUTPATIENT
Start: 2023-03-06

## 2023-03-06 RX ORDER — LISINOPRIL 5 MG/1
5 TABLET ORAL DAILY
Qty: 90 TABLET | Refills: 1 | OUTPATIENT
Start: 2023-03-06

## 2023-03-07 NOTE — TELEPHONE ENCOUNTER
LOV 03/02/22. RTC 2 months. No f/u. Per TE 02/28/23  \"Called to help schedule a f/u appt, pt answered and stated, \"I'm not ready to schedule an appt. I will call you guys back when I'm ready\" \". Sent Mychart. Routed to provider.

## 2023-03-07 NOTE — TELEPHONE ENCOUNTER
From: Franco Azevedo  To: Saleem Dueñas MD  Sent: 3/5/2023 5:24 PM CST  Subject: Refill     Hi I need a refill on my trulicity please at [facility] thank you

## 2023-03-08 ENCOUNTER — TELEPHONE (OUTPATIENT)
Dept: INTERNAL MEDICINE CLINIC | Facility: CLINIC | Age: 43
End: 2023-03-08

## 2023-03-08 RX ORDER — DULAGLUTIDE 0.75 MG/.5ML
0.75 INJECTION, SOLUTION SUBCUTANEOUS WEEKLY
Qty: 2 ML | Refills: 0 | Status: SHIPPED | OUTPATIENT
Start: 2023-03-08 | End: 2023-03-08

## 2023-03-13 ENCOUNTER — OFFICE VISIT (OUTPATIENT)
Dept: ENDOCRINOLOGY CLINIC | Facility: CLINIC | Age: 43
End: 2023-03-13

## 2023-03-13 ENCOUNTER — TELEPHONE (OUTPATIENT)
Dept: INTERNAL MEDICINE CLINIC | Facility: CLINIC | Age: 43
End: 2023-03-13

## 2023-03-13 VITALS
SYSTOLIC BLOOD PRESSURE: 108 MMHG | WEIGHT: 273 LBS | DIASTOLIC BLOOD PRESSURE: 67 MMHG | HEART RATE: 69 BPM | BODY MASS INDEX: 36 KG/M2

## 2023-03-13 DIAGNOSIS — E11.65 UNCONTROLLED TYPE 2 DIABETES MELLITUS WITH HYPERGLYCEMIA (HCC): Primary | ICD-10-CM

## 2023-03-13 LAB
CARTRIDGE LOT#: ABNORMAL NUMERIC
GLUCOSE BLOOD: 253
HEMOGLOBIN A1C: 9 % (ref 4.3–5.6)
TEST STRIP LOT #: NORMAL NUMERIC

## 2023-03-13 RX ORDER — GLIMEPIRIDE 4 MG/1
4 TABLET ORAL
Qty: 30 TABLET | Refills: 1 | Status: SHIPPED | OUTPATIENT
Start: 2023-03-13

## 2023-03-13 RX ORDER — DULAGLUTIDE 1.5 MG/.5ML
1.5 INJECTION, SOLUTION SUBCUTANEOUS WEEKLY
Qty: 1 ML | Refills: 0 | Status: SHIPPED | OUTPATIENT
Start: 2023-03-13

## 2023-03-13 RX ORDER — DULAGLUTIDE 3 MG/.5ML
3 INJECTION, SOLUTION SUBCUTANEOUS WEEKLY
Qty: 2 ML | Refills: 0 | Status: SHIPPED | OUTPATIENT
Start: 2023-04-03

## 2023-03-13 NOTE — PATIENT INSTRUCTIONS
A1C: 9.0% today --> increased from 8.6% on 3/2/2022  Blood glucose: 253 in clinic today    Medications:   -continue with Metformin 1,000mg twice daily   - increase Trulicity 9.59 --> 1.5 mg once weekly on Sunday for 4 weeks   --> week 5: increase Trulicity 3mg once weekly   - start glimepiride 4mg once daily before dinner (take around 30 mins before eating)    - lets get back on track with low carb diet   - increase exercise to 3x weekly for at least 30 mins each session     Weight:  Wt Readings from Last 6 Encounters:  03/13/23 : 273 lb (123.8 kg)  11/09/22 : 270 lb (122.5 kg)  08/11/22 : 263 lb (119.3 kg)  03/02/22 : 266 lb (120.7 kg)  02/23/22 : 268 lb (121.6 kg)  12/21/21 : 272 lb (123.4 kg)    A1C goal:  <7.0%    Blood sugar testing:  Test your blood sugar 1 time daily   Recommended times to test: alternate with before breakfast (fasting) or before dinner    Blood sugar targets:  Before breakfast:   (preferably < 110)  2 hours after meals: <150    Call for persistent blood sugars < 75 or > 200

## 2023-04-04 RX ORDER — GLIMEPIRIDE 4 MG/1
4 TABLET ORAL
Qty: 30 TABLET | Refills: 1 | Status: SHIPPED | OUTPATIENT
Start: 2023-04-04

## 2023-04-04 NOTE — TELEPHONE ENCOUNTER
LOV 3/13/23. RTC 6 weeks. No f/u. Pended 1 month supply. Called patient to schedule f/u visit LMTCB.

## 2023-05-03 RX ORDER — GLIMEPIRIDE 4 MG/1
4 TABLET ORAL
Qty: 30 TABLET | Refills: 1 | Status: SHIPPED | OUTPATIENT
Start: 2023-05-03

## 2023-05-03 NOTE — TELEPHONE ENCOUNTER
LOV: 3/13/23    RTC: 6 weeks    FU: No FU Appt Scheduled    2 Month Supply Pending    Called pt to schedule follow up visit. Pt stated he will call back when he is not busy.

## 2023-05-09 NOTE — ADDENDUM NOTE
Addended by: Vianney Hudson on: 9/26/2017 10:01 AM     Modules accepted: Dahiana Matamoros
[FreeTextEntry2] : right knee\par

## 2023-05-18 RX ORDER — DULAGLUTIDE 3 MG/.5ML
3 INJECTION, SOLUTION SUBCUTANEOUS WEEKLY
Qty: 6 ML | Refills: 0 | Status: SHIPPED | OUTPATIENT
Start: 2023-05-18

## 2023-05-18 NOTE — TELEPHONE ENCOUNTER
LOV:  3/13/23    RTC:  6 Weeks    FU: 7/12/23    Last Refill: 5/2/23      Called to help schedule a f/u appt

## 2023-05-24 RX ORDER — DULAGLUTIDE 3 MG/.5ML
3 INJECTION, SOLUTION SUBCUTANEOUS WEEKLY
Qty: 6 ML | Refills: 0 | Status: SHIPPED | OUTPATIENT
Start: 2023-05-24 | End: 2023-05-25

## 2023-05-25 RX ORDER — DULAGLUTIDE 3 MG/.5ML
3 INJECTION, SOLUTION SUBCUTANEOUS WEEKLY
Qty: 6 ML | Refills: 0 | Status: SHIPPED | OUTPATIENT
Start: 2023-05-25

## 2023-06-01 RX ORDER — GLIMEPIRIDE 4 MG/1
4 TABLET ORAL
Qty: 30 TABLET | Refills: 2 | Status: SHIPPED | OUTPATIENT
Start: 2023-06-01

## 2023-06-14 ENCOUNTER — HOSPITAL ENCOUNTER (OUTPATIENT)
Dept: CT IMAGING | Facility: HOSPITAL | Age: 43
Discharge: HOME | End: 2023-06-14
Attending: INTERNAL MEDICINE

## 2023-06-14 VITALS — BODY MASS INDEX: 36.18 KG/M2 | HEIGHT: 73 IN | WEIGHT: 273 LBS

## 2023-06-14 DIAGNOSIS — Z13.6 SCREENING FOR CARDIOVASCULAR CONDITION: ICD-10-CM

## 2023-06-14 LAB
POCT GLUCOSE CHOLESTECH: 107 (ref 70–99)
POCT HDL: 34 (ref 40–60)
POCT LDL: 49 (ref 0–99)
POCT TOTAL CHOLESTEROL: 136 (ref 110–200)
POCT TRIGLYCERIDES: 263 (ref 1–149)

## 2023-06-20 RX ORDER — DULAGLUTIDE 3 MG/.5ML
3 INJECTION, SOLUTION SUBCUTANEOUS WEEKLY
Qty: 6 ML | Refills: 0 | Status: SHIPPED | OUTPATIENT
Start: 2023-06-20

## 2023-06-28 ENCOUNTER — OFFICE VISIT (OUTPATIENT)
Dept: ENDOCRINOLOGY CLINIC | Facility: CLINIC | Age: 43
End: 2023-06-28

## 2023-06-28 VITALS
BODY MASS INDEX: 37 KG/M2 | HEART RATE: 89 BPM | SYSTOLIC BLOOD PRESSURE: 119 MMHG | WEIGHT: 279 LBS | DIASTOLIC BLOOD PRESSURE: 75 MMHG

## 2023-06-28 DIAGNOSIS — E11.9 TYPE 2 DIABETES MELLITUS WITHOUT COMPLICATION, WITHOUT LONG-TERM CURRENT USE OF INSULIN (HCC): Primary | ICD-10-CM

## 2023-06-28 LAB
CARTRIDGE LOT#: ABNORMAL NUMERIC
GLUCOSE BLOOD: 151
HEMOGLOBIN A1C: 7.1 % (ref 4.3–5.6)
TEST STRIP LOT #: NORMAL NUMERIC

## 2023-06-28 PROCEDURE — 3051F HG A1C>EQUAL 7.0%<8.0%: CPT | Performed by: NURSE PRACTITIONER

## 2023-06-28 PROCEDURE — 3078F DIAST BP <80 MM HG: CPT | Performed by: NURSE PRACTITIONER

## 2023-06-28 PROCEDURE — 82947 ASSAY GLUCOSE BLOOD QUANT: CPT | Performed by: NURSE PRACTITIONER

## 2023-06-28 PROCEDURE — 83036 HEMOGLOBIN GLYCOSYLATED A1C: CPT | Performed by: NURSE PRACTITIONER

## 2023-06-28 PROCEDURE — 3074F SYST BP LT 130 MM HG: CPT | Performed by: NURSE PRACTITIONER

## 2023-06-28 PROCEDURE — 99213 OFFICE O/P EST LOW 20 MIN: CPT | Performed by: NURSE PRACTITIONER

## 2023-06-28 RX ORDER — SEMAGLUTIDE 0.68 MG/ML
0.5 INJECTION, SOLUTION SUBCUTANEOUS WEEKLY
Qty: 3 ML | Refills: 0 | Status: SHIPPED | OUTPATIENT
Start: 2023-06-28

## 2023-07-19 ENCOUNTER — LAB ENCOUNTER (OUTPATIENT)
Dept: LAB | Age: 43
End: 2023-07-19
Attending: NURSE PRACTITIONER
Payer: COMMERCIAL

## 2023-07-19 DIAGNOSIS — E11.9 TYPE 2 DIABETES MELLITUS WITHOUT COMPLICATION, WITHOUT LONG-TERM CURRENT USE OF INSULIN (HCC): ICD-10-CM

## 2023-07-19 LAB
ALBUMIN SERPL-MCNC: 3.6 G/DL (ref 3.4–5)
ALBUMIN/GLOB SERPL: 1 {RATIO} (ref 1–2)
ALP LIVER SERPL-CCNC: 64 U/L
ALT SERPL-CCNC: 31 U/L
ANION GAP SERPL CALC-SCNC: 8 MMOL/L (ref 0–18)
AST SERPL-CCNC: 10 U/L (ref 15–37)
BILIRUB SERPL-MCNC: 0.4 MG/DL (ref 0.1–2)
BUN BLD-MCNC: 17 MG/DL (ref 7–18)
CALCIUM BLD-MCNC: 9.1 MG/DL (ref 8.5–10.1)
CHLORIDE SERPL-SCNC: 105 MMOL/L (ref 98–112)
CO2 SERPL-SCNC: 25 MMOL/L (ref 21–32)
CREAT BLD-MCNC: 0.94 MG/DL
CREAT UR-SCNC: 160 MG/DL
FASTING STATUS PATIENT QL REPORTED: YES
GFR SERPLBLD BASED ON 1.73 SQ M-ARVRAT: 103 ML/MIN/1.73M2 (ref 60–?)
GLOBULIN PLAS-MCNC: 3.7 G/DL (ref 2.8–4.4)
GLUCOSE BLD-MCNC: 148 MG/DL (ref 70–99)
MICROALBUMIN UR-MCNC: 1.11 MG/DL
MICROALBUMIN/CREAT 24H UR-RTO: 6.9 UG/MG (ref ?–30)
OSMOLALITY SERPL CALC.SUM OF ELEC: 290 MOSM/KG (ref 275–295)
POTASSIUM SERPL-SCNC: 4.2 MMOL/L (ref 3.5–5.1)
PROT SERPL-MCNC: 7.3 G/DL (ref 6.4–8.2)
SODIUM SERPL-SCNC: 138 MMOL/L (ref 136–145)

## 2023-07-19 PROCEDURE — 82570 ASSAY OF URINE CREATININE: CPT

## 2023-07-19 PROCEDURE — 82043 UR ALBUMIN QUANTITATIVE: CPT

## 2023-07-19 PROCEDURE — 3061F NEG MICROALBUMINURIA REV: CPT | Performed by: INTERNAL MEDICINE

## 2023-07-19 PROCEDURE — 80053 COMPREHEN METABOLIC PANEL: CPT

## 2023-07-19 PROCEDURE — 36415 COLL VENOUS BLD VENIPUNCTURE: CPT

## 2023-08-02 ENCOUNTER — TELEPHONE (OUTPATIENT)
Dept: INTERNAL MEDICINE CLINIC | Facility: CLINIC | Age: 43
End: 2023-08-02

## 2023-08-03 RX ORDER — METFORMIN HYDROCHLORIDE 500 MG/1
1000 TABLET, EXTENDED RELEASE ORAL 2 TIMES DAILY WITH MEALS
Qty: 80 TABLET | Refills: 0 | Status: SHIPPED | OUTPATIENT
Start: 2023-08-03

## 2023-08-03 NOTE — TELEPHONE ENCOUNTER
Please review. Protocol failed / Has no protocol. dianboom message sent to patient to schedule an office visit with PCP. Will also make a phone attempt. Requested Prescriptions   Pending Prescriptions Disp Refills    metFORMIN  MG Oral Tablet 24 Hr 360 tablet 0     Sig: Take 2 tablets (1,000 mg total) by mouth 2 (two) times daily with meals.        Diabetes Medication Protocol Failed - 8/2/2023  9:11 AM        Failed - In person appointment or virtual visit in the past 6 mos or appointment in next 3 mos     Recent Outpatient Visits              1 month ago Type 2 diabetes mellitus without complication, without long-term current use of insulin ([address]    [address], [address], [address] [place] [address]    Office Visit    4 months ago Uncontrolled type 2 diabetes mellitus with hyperglycemia ([address]    [address], [address], [place]jas, [facility]    Office Visit    11 months ago Vasectomy evaluation    [address]    Office Visit    1 year ago Uncontrolled type 2 diabetes mellitus with hyperglycemia [facility])    [address], [address], [facility]    Office Visit    1 year ago LLQ pain    Rebel Palomino MD    Office Visit                      Passed - Last A1C < 7.5 and within past 6 months     Lab Results   Component Value Date    A1C 7.1 (A) 06/28/2023             Passed - EGFRCR or GFRNAA > 50     GFR Evaluation  EGFRCR: 103 , resulted on 7/19/2023          Passed - GFR in the past 12 months              Recent Outpatient Visits              1 month ago Type 2 diabetes mellitus without complication, without long-term current use of insulin ([facility] [address]    [address], [address], [facility]    Office Visit    4 months ago Uncontrolled type 2 diabetes mellitus with hyperglycemia [facility])    [address] [facility] [place] , APRN    Office Visit    11 months ago Vasectomy evaluation    Nick Dawkins MD    Office Visit    1 year ago Uncontrolled type 2 diabetes mellitus with hyperglycemia [facility])    [address], [address] , [address] [place] [address], [address] [place] [address], APRN    Office Visit    1 year ago LLQ pain    Juan C Rausch MD    Office Visit

## 2023-08-04 RX ORDER — METFORMIN HYDROCHLORIDE 500 MG/1
1000 TABLET, EXTENDED RELEASE ORAL 2 TIMES DAILY WITH MEALS
Qty: 360 TABLET | Refills: 0 | OUTPATIENT
Start: 2023-08-04

## 2023-08-04 NOTE — TELEPHONE ENCOUNTER
Duplicate request, previously addressed. Disp Refills Start End    metFORMIN  MG Oral Tablet 24 Hr 80 tablet 0 8/3/2023     Sig - Route: Take 2 tablets (1,000 mg total) by mouth 2 (two) times daily with meals. - Oral    Sent to pharmacy as: metFORMIN HCl  MG Oral Tablet Extended Release 24 Hour (Glucophage XR)    Notes to Pharmacy: 90 day refill given on 02/27/23, appointment & labs needed for further refills.     E-Prescribing Status: Receipt confirmed by pharmacy (8/3/2023  9:34 PM CDT)

## 2023-09-13 RX ORDER — METFORMIN HYDROCHLORIDE 500 MG/1
1000 TABLET, EXTENDED RELEASE ORAL 2 TIMES DAILY WITH MEALS
Qty: 120 TABLET | Refills: 0 | Status: SHIPPED | OUTPATIENT
Start: 2023-09-13 | End: 2023-09-18

## 2023-09-13 RX ORDER — METFORMIN HYDROCHLORIDE 500 MG/1
1000 TABLET, EXTENDED RELEASE ORAL 2 TIMES DAILY WITH MEALS
Qty: 80 TABLET | Refills: 0 | OUTPATIENT
Start: 2023-09-13

## 2023-09-14 RX ORDER — METFORMIN HYDROCHLORIDE 500 MG/1
1000 TABLET, EXTENDED RELEASE ORAL 2 TIMES DAILY WITH MEALS
Qty: 120 TABLET | Refills: 0 | OUTPATIENT
Start: 2023-09-14

## 2023-09-18 ENCOUNTER — OFFICE VISIT (OUTPATIENT)
Dept: INTERNAL MEDICINE CLINIC | Facility: CLINIC | Age: 43
End: 2023-09-18

## 2023-09-18 VITALS
WEIGHT: 275 LBS | SYSTOLIC BLOOD PRESSURE: 102 MMHG | DIASTOLIC BLOOD PRESSURE: 62 MMHG | HEIGHT: 73 IN | HEART RATE: 73 BPM | BODY MASS INDEX: 36.45 KG/M2

## 2023-09-18 DIAGNOSIS — Z00.00 PHYSICAL EXAM: Primary | ICD-10-CM

## 2023-09-18 DIAGNOSIS — E11.9 DIABETES MELLITUS TYPE 2 WITHOUT RETINOPATHY (HCC): ICD-10-CM

## 2023-09-18 PROCEDURE — 3078F DIAST BP <80 MM HG: CPT | Performed by: INTERNAL MEDICINE

## 2023-09-18 PROCEDURE — 90471 IMMUNIZATION ADMIN: CPT | Performed by: INTERNAL MEDICINE

## 2023-09-18 PROCEDURE — 99396 PREV VISIT EST AGE 40-64: CPT | Performed by: INTERNAL MEDICINE

## 2023-09-18 PROCEDURE — 3008F BODY MASS INDEX DOCD: CPT | Performed by: INTERNAL MEDICINE

## 2023-09-18 PROCEDURE — 3074F SYST BP LT 130 MM HG: CPT | Performed by: INTERNAL MEDICINE

## 2023-09-18 PROCEDURE — 90686 IIV4 VACC NO PRSV 0.5 ML IM: CPT | Performed by: INTERNAL MEDICINE

## 2023-09-18 RX ORDER — METFORMIN HYDROCHLORIDE 500 MG/1
1000 TABLET, EXTENDED RELEASE ORAL 2 TIMES DAILY WITH MEALS
Qty: 180 TABLET | Refills: 3 | Status: SHIPPED | OUTPATIENT
Start: 2023-09-18

## 2023-09-20 ENCOUNTER — HOSPITAL ENCOUNTER (EMERGENCY)
Facility: HOSPITAL | Age: 43
Discharge: HOME | End: 2023-09-20
Attending: EMERGENCY MEDICINE
Payer: COMMERCIAL

## 2023-09-20 ENCOUNTER — APPOINTMENT (OUTPATIENT)
Dept: MRI IMAGING | Facility: HOSPITAL | Age: 43
End: 2023-09-20
Attending: EMERGENCY MEDICINE
Payer: COMMERCIAL

## 2023-09-20 ENCOUNTER — APPOINTMENT (OUTPATIENT)
Dept: CT IMAGING | Facility: HOSPITAL | Age: 43
End: 2023-09-20
Attending: EMERGENCY MEDICINE
Payer: COMMERCIAL

## 2023-09-20 VITALS
TEMPERATURE: 98 F | DIASTOLIC BLOOD PRESSURE: 69 MMHG | RESPIRATION RATE: 20 BRPM | OXYGEN SATURATION: 98 % | HEART RATE: 70 BPM | SYSTOLIC BLOOD PRESSURE: 112 MMHG

## 2023-09-20 DIAGNOSIS — H81.10 BENIGN PAROXYSMAL POSITIONAL VERTIGO, UNSPECIFIED LATERALITY: Primary | ICD-10-CM

## 2023-09-20 LAB
ANION GAP SERPL CALC-SCNC: 9 MMOL/L (ref 0–18)
ATRIAL RATE: 72 BPM
BASOPHILS # BLD AUTO: 0.07 X10(3) UL (ref 0–0.2)
BASOPHILS NFR BLD AUTO: 0.8 %
BUN BLD-MCNC: 16 MG/DL (ref 7–18)
BUN/CREAT SERPL: 17 (ref 10–20)
CALCIUM BLD-MCNC: 9 MG/DL (ref 8.5–10.1)
CHLORIDE SERPL-SCNC: 106 MMOL/L (ref 98–112)
CO2 SERPL-SCNC: 25 MMOL/L (ref 21–32)
CREAT BLD-MCNC: 0.94 MG/DL
DEPRECATED RDW RBC AUTO: 38.4 FL (ref 35.1–46.3)
EGFRCR SERPLBLD CKD-EPI 2021: 103 ML/MIN/1.73M2 (ref 60–?)
EOSINOPHIL # BLD AUTO: 0.21 X10(3) UL (ref 0–0.7)
EOSINOPHIL NFR BLD AUTO: 2.5 %
ERYTHROCYTE [DISTWIDTH] IN BLOOD BY AUTOMATED COUNT: 12 % (ref 11–15)
GLUCOSE BLD-MCNC: 243 MG/DL (ref 70–99)
HCT VFR BLD AUTO: 47 %
HGB BLD-MCNC: 15.9 G/DL
IMM GRANULOCYTES # BLD AUTO: 0.05 X10(3) UL (ref 0–1)
IMM GRANULOCYTES NFR BLD: 0.6 %
LYMPHOCYTES # BLD AUTO: 2.07 X10(3) UL (ref 1–4)
LYMPHOCYTES NFR BLD AUTO: 24.9 %
MCH RBC QN AUTO: 29.4 PG (ref 26–34)
MCHC RBC AUTO-ENTMCNC: 33.8 G/DL (ref 31–37)
MCV RBC AUTO: 86.9 FL
MONOCYTES # BLD AUTO: 0.78 X10(3) UL (ref 0.1–1)
MONOCYTES NFR BLD AUTO: 9.4 %
NEUTROPHILS # BLD AUTO: 5.14 X10 (3) UL (ref 1.5–7.7)
NEUTROPHILS # BLD AUTO: 5.14 X10(3) UL (ref 1.5–7.7)
NEUTROPHILS NFR BLD AUTO: 61.8 %
OSMOLALITY SERPL CALC.SUM OF ELEC: 299 MOSM/KG (ref 275–295)
P AXIS: 53 DEGREES
P-R INTERVAL: 184 MS
PLATELET # BLD AUTO: 210 10(3)UL (ref 150–450)
POTASSIUM SERPL-SCNC: 3.8 MMOL/L (ref 3.5–5.1)
Q-T INTERVAL: 386 MS
QRS DURATION: 98 MS
QTC CALCULATION (BEZET): 422 MS
R AXIS: 7 DEGREES
RBC # BLD AUTO: 5.41 X10(6)UL
SODIUM SERPL-SCNC: 140 MMOL/L (ref 136–145)
T AXIS: 16 DEGREES
TROPONIN I HIGH SENSITIVITY: 4 NG/L
VENTRICULAR RATE: 72 BPM
WBC # BLD AUTO: 8.3 X10(3) UL (ref 4–11)

## 2023-09-20 PROCEDURE — 93005 ELECTROCARDIOGRAM TRACING: CPT

## 2023-09-20 PROCEDURE — 96374 THER/PROPH/DIAG INJ IV PUSH: CPT

## 2023-09-20 PROCEDURE — 99284 EMERGENCY DEPT VISIT MOD MDM: CPT

## 2023-09-20 PROCEDURE — 80048 BASIC METABOLIC PNL TOTAL CA: CPT | Performed by: EMERGENCY MEDICINE

## 2023-09-20 PROCEDURE — 84484 ASSAY OF TROPONIN QUANT: CPT | Performed by: EMERGENCY MEDICINE

## 2023-09-20 PROCEDURE — 93010 ELECTROCARDIOGRAM REPORT: CPT

## 2023-09-20 PROCEDURE — 70551 MRI BRAIN STEM W/O DYE: CPT | Performed by: EMERGENCY MEDICINE

## 2023-09-20 PROCEDURE — 70450 CT HEAD/BRAIN W/O DYE: CPT | Performed by: EMERGENCY MEDICINE

## 2023-09-20 PROCEDURE — 96361 HYDRATE IV INFUSION ADD-ON: CPT

## 2023-09-20 PROCEDURE — 85025 COMPLETE CBC W/AUTO DIFF WBC: CPT | Performed by: EMERGENCY MEDICINE

## 2023-09-20 PROCEDURE — 96375 TX/PRO/DX INJ NEW DRUG ADDON: CPT

## 2023-09-20 RX ORDER — ONDANSETRON 2 MG/ML
4 INJECTION INTRAMUSCULAR; INTRAVENOUS ONCE
Status: COMPLETED | OUTPATIENT
Start: 2023-09-20 | End: 2023-09-20

## 2023-09-20 RX ORDER — MECLIZINE HYDROCHLORIDE 25 MG/1
25 TABLET ORAL ONCE
Status: COMPLETED | OUTPATIENT
Start: 2023-09-20 | End: 2023-09-20

## 2023-09-20 RX ORDER — ONDANSETRON 4 MG/1
4 TABLET, ORALLY DISINTEGRATING ORAL EVERY 4 HOURS PRN
Qty: 10 TABLET | Refills: 0 | Status: SHIPPED | OUTPATIENT
Start: 2023-09-20 | End: 2023-09-27

## 2023-09-20 RX ORDER — MECLIZINE HYDROCHLORIDE 25 MG/1
25 TABLET ORAL 3 TIMES DAILY PRN
Qty: 30 TABLET | Refills: 0 | Status: SHIPPED | OUTPATIENT
Start: 2023-09-20

## 2023-09-20 RX ORDER — DIAZEPAM 5 MG/ML
2.5 INJECTION, SOLUTION INTRAMUSCULAR; INTRAVENOUS ONCE
Status: COMPLETED | OUTPATIENT
Start: 2023-09-20 | End: 2023-09-20

## 2023-09-20 NOTE — ED PROVIDER NOTES
CONCLUSION:   1. No acute intracranial process. No evidence of acute or subacute infarct. 2.  Calcified extra-axial focus along the lateral aspect of the right frontal lobe, compatible with meningioma. 3.  Mild chronic inflammation in the right frontal and ethmoid sinuses. Discussed findings with patient. He feels improved after the meclizine Valium and fluids. He is able to ambulate without difficulty. He is comfortable discharge home and PMD follow-up. Return precautions and follow-up instructions were discussed with patient who voiced understanding and agreement the plan. All questions were answered to patient satisfaction.

## 2023-09-20 NOTE — ED INITIAL ASSESSMENT (HPI)
Pt to ED via EMS for dizziness. Per pt, he woke up from sleep and started feeling dizzy and diaphoretic. Pt denies chest pain or sob. Pt has had a sinus cold the past few days.

## 2023-09-25 ENCOUNTER — OFFICE VISIT (OUTPATIENT)
Dept: FAMILY MEDICINE CLINIC | Facility: CLINIC | Age: 43
End: 2023-09-25
Payer: COMMERCIAL

## 2023-09-25 VITALS
RESPIRATION RATE: 16 BRPM | HEART RATE: 87 BPM | SYSTOLIC BLOOD PRESSURE: 112 MMHG | BODY MASS INDEX: 34.65 KG/M2 | HEIGHT: 74 IN | TEMPERATURE: 98 F | OXYGEN SATURATION: 98 % | DIASTOLIC BLOOD PRESSURE: 70 MMHG | WEIGHT: 270 LBS

## 2023-09-25 DIAGNOSIS — H66.003 ACUTE SUPPURATIVE OTITIS MEDIA OF BOTH EARS WITHOUT SPONTANEOUS RUPTURE OF TYMPANIC MEMBRANES, RECURRENCE NOT SPECIFIED: Primary | ICD-10-CM

## 2023-09-25 DIAGNOSIS — H61.22 IMPACTED CERUMEN OF LEFT EAR: ICD-10-CM

## 2023-09-25 PROCEDURE — 3074F SYST BP LT 130 MM HG: CPT | Performed by: NURSE PRACTITIONER

## 2023-09-25 PROCEDURE — 99213 OFFICE O/P EST LOW 20 MIN: CPT | Performed by: NURSE PRACTITIONER

## 2023-09-25 PROCEDURE — 3008F BODY MASS INDEX DOCD: CPT | Performed by: NURSE PRACTITIONER

## 2023-09-25 PROCEDURE — 3078F DIAST BP <80 MM HG: CPT | Performed by: NURSE PRACTITIONER

## 2023-09-25 RX ORDER — AMOXICILLIN AND CLAVULANATE POTASSIUM 875; 125 MG/1; MG/1
1 TABLET, FILM COATED ORAL 2 TIMES DAILY
Qty: 14 TABLET | Refills: 0 | Status: SHIPPED | OUTPATIENT
Start: 2023-09-25 | End: 2023-10-02

## 2023-09-28 ENCOUNTER — MOBILE ENCOUNTER (OUTPATIENT)
Dept: INTERNAL MEDICINE CLINIC | Facility: CLINIC | Age: 43
End: 2023-09-28

## 2023-09-28 RX ORDER — NEOMYCIN SULFATE, POLYMYXIN B SULFATE AND HYDROCORTISONE 10; 3.5; 1 MG/ML; MG/ML; [USP'U]/ML
3 SUSPENSION/ DROPS AURICULAR (OTIC) 4 TIMES DAILY
Qty: 10 ML | Refills: 0 | Status: SHIPPED | OUTPATIENT
Start: 2023-09-28 | End: 2023-10-08

## 2023-09-28 RX ORDER — METFORMIN HYDROCHLORIDE 500 MG/1
1000 TABLET, EXTENDED RELEASE ORAL 2 TIMES DAILY WITH MEALS
Qty: 180 TABLET | Refills: 3 | OUTPATIENT
Start: 2023-09-28

## 2023-10-01 NOTE — PROGRESS NOTES
NEW PATIENT PROGRESS NOTE  OTOLOGY/OTOLARYNGOLOGY    REF MD:  No referring provider defined for this encounter. PCP: Inocencia Gracia MD    CHIEF COMPLAINT:  Patient presents with:  Ringing In Ear: Patient here for ringing in ears  Ear Problem: Patient complains of ears feeling inflammed      HISTORY OF PRESENT ILLNESS: Erickson Hudson is a 39year old male who presents for evaluation of bilateral ear fullness and tinnitus. Endorses history of ear infections, Eustachian tube dysfunction and fluid building up in ears when sick. Had PE tubes as a kid. Last myringotomy was 20 years ago. History of cerumen impactions. Today, denies vertigo. Congestion is improving. Went to ED on 9/20/23 for dizziness upon waking up, described as \"room spinning,\" worse with head movements, lasting several hours along with ongoing sinus infection. Normal MRI brain and CT head. Improved with IV valium and other anti-emetics. Discharged with diagnosis of BPPV and instructed to use Sudafed. Had intermittent brief episodes of vertigo, but less severe than first attack and meclizine alleviated symptoms. Went to PCP on 9/25/23 for new-onset left ear pain and fullness. Diagnosed with bilateral acute otitis media and discharged with Neomycin otic drops and 7 days of Augmentin, which he just finished. Endorses history of ear infections. PAST MEDICAL HISTORY:    Past Medical History:   Diagnosis Date    Diabetes ([facility] [address]     Hyperlipidemia     Tonsillitis     Unspecified sleep apnea        PAST SURGICAL HISTORY:    Past Surgical History:   Procedure Laterality Date    TONSILLECTOMY  2000       neomycin-polymyxin-hydrocortisone 3.5-55422-2 Otic Suspension, Place 3 drops in ear(s) 4 (four) times daily for 10 days. Both ears, Disp: 10 mL, Rfl: 0  meclizine 25 MG Oral Tab, Take 1 tablet (25 mg total) by mouth 3 (three) times daily as needed for Dizziness. , Disp: 30 tablet, Rfl: 0  metFORMIN  MG Oral Tablet 24 Hr, Take 2 tablets (1,000 mg total) by mouth 2 (two) times daily with meals. , Disp: 180 tablet, Rfl: 3  semaglutide 8 MG/3ML Subcutaneous Solution Pen-injector, Inject 2 mg into the skin once a week., Disp: 9 mL, Rfl: 1  lisinopril 5 MG Oral Tab, Take 1 tablet (5 mg total) by mouth daily. , Disp: 90 tablet, Rfl: 1  aspirin 81 MG Oral Tab EC, TAKE 1 TABLET BY MOUTH EVERY DAY, Disp: 90 tablet, Rfl: 1  Glucose Blood (CONTOUR NEXT TEST) In Vitro Strip, Test twice a day, Disp: 200 each, Rfl: 3  atorvastatin 10 MG Oral Tab, Take 0.5 tablets (5 mg total) by mouth nightly., Disp: 90 tablet, Rfl: 1  Insulin Pen Needle (BD PEN NEEDLE MINI U/F) 31G X 5 MM Does not apply Misc, USE AS DIRECTED, Disp: 100 each, Rfl: 3  ACCU-CHEK SOFTCLIX LANCETS Does not apply Misc, Test glucose twice daily, Disp: 200 each, Rfl: 3  amoxicillin clavulanate 875-125 MG Oral Tab, Take 1 tablet by mouth 2 (two) times daily for 7 days. (Patient not taking: Reported on 10/2/2023), Disp: 14 tablet, Rfl: 0    No current facility-administered medications on file prior to visit. Allergies: No Known Allergies    SOCIAL HISTORY:  Social History    Tobacco Use      Smoking status: Former        Passive exposure: Past      Smokeless tobacco: Never    Alcohol use: Yes      Alcohol/week: 0.0 standard drinks of alcohol      Comment: YES, per [employer] beer and liquor occ      FAMILY HISTORY: Denies known family history of hearing loss, tinnitus, vertigo, or migraine. Denies known family history of head and neck cancer, thyroid cancer, bleeding disorders.      REVIEW OF SYSTEMS:   Positives are in bold  Neuro: Headache, facial weakness, facial numbness, neck pain, vertigo  ENT: Hearing change, tinnitus, otorrhea, otalgia, aural fullness, ear pressure, vertigo, imbalance  Sinus pressure, rhinorrhea, congestion, facial pain, jaw pain, dysphagia, odynophagia, sore throat, voice changes, shortness of breath      EXAMINATION:  I washed my hands with an alcohol-based hand gel prior to examination  Constitutional:   --Vitals: Height 6' 2\" (1.88 m), weight 270 lb. --General: no apparent distress, well-developed, conversant  Psych: affect pleasant and appropriate for age, alert and oriented  Neuro: Facial movement normal bilateral  Eyes: Pupils equal, symmetric and reactive to light. Extra-ocular muscles intact  Respiratory: No stridor, stertor or increased work of breathing  ENT:  --Nose: no external nasal deformity, anterior rhinoscopy: Septum midline, no inferior turbinate hypertrophy, mucosa edematous, no rhinorrhea  --Ear: The bilateral ears were examined under binocular microscopy  Right ear microscopic exam:  Pinna: Normal, no lesions or masses. Mastoid: Nontender on palpation. External auditory canal: Cerumen impaction - removed. Clear, no masses or lesions. Tympanic membrane: Intact, no lesions, normal landmarks. Middle ear: Serous otitis media    Left ear microscopic exam:  Pinna: Normal, no lesions or masses. Mastoid: Nontender on palpation. External auditory canal: Edematous, erythematous. Otic drop debris - suctioned. Tympanic membrane: Intact, no lesions, normal landmarks. Middle ear: Opacified with effusion    Cerumen removal: (10/02/23)  Under binocular microscopy cerumen was removed from the right external auditory canal using a wax loop curette and suction. Patient noted subjective improvement in hearing.      Vestibular examination (10/02/23): Performed with infrared frenzel goggles  Hallpike right: negative, Hallpike left: rotatory geotropic nystagmus,    Latest Audiogram Result (Hz) Exam performed: 10/2/2023 9:33 AM Last edited by TORY Munoz on 10/2/2023 9:46 AM        125 250  1500 2000 3000 4000 6000 8000    Right air:  25 25  20  20 30 35  35    Left air:  30 35  40  45  45  45    Right mastoid bone:   25  15  20  30      Left mastoid bone (masked):   20  20  35  30      Masking right (mastoid bone):   50  40  50  55         Reliability:  Good Transducer: Inserts    Technique:  Conventional Audiometry    Comments:            Latest Speech Audiometry  Last edited by TORY Redd on 10/2/2023 9:46 AM       Ear Method PTA SAT SRT [facility] Test/list Score (%) Intensity Mask/noise Notes    right live voice   25   10 By Difficulty 96 55      left live voice   35   10 By Difficulty 96 70 50                   Latest Tympanogram Result       Probe Tone (Hz): 226 Exam performed: 10/2/2023 9:35 AM Last edited by TORY Redd on 10/2/2023 9:46 AM      Tympanograms  These were drawn by a user, not generated from device data      Right Ear Left Ear                     Right Ear Left Ear    Tympanogram type: Type As Type B    Canal volume (mL): 1.3 1.2    Peak pressure (daPa): -179     Peak amplitude (mL): 0.2     Tympanogram width (daPa): Comments:                      CT head w/o contrast 9/20/23  CONCLUSION: No acute intracranial process. 14 mm diameter extra-axial calcification peripheral to the right frontal-temporal lobe likely representing a calcified meningioma, and this finding can otherwise be better characterized with MRI. MRI brain w/o contrast 9/20/23  CONCLUSION:   1. No acute intracranial process. No evidence of acute or subacute infarct. 2.  Calcified extra-axial focus along the lateral aspect of the right frontal lobe, compatible with meningioma. 3.  Mild chronic inflammation in the right frontal and ethmoid sinuses.      ASSESSMENT/PLAN:  Bro Johnson is a 39year old male with   (H93.13) Tinnitus of both ears  (primary encounter diagnosis)  (H69.93) Eustachian tube dysfunction, bilateral  (H81.12) Benign paroxysmal positional vertigo of left ear  (H65.06) Recurrent acute serous otitis media of both ears  (H90.12) Conductive hearing loss of left ear with unrestricted hearing of right ear     IMPRESSION:  Possible mild left vestibular neuritis - largely resolved (large vertigo attack)   BPPV - left posterior canalithiasis (subsequent to vestibular neuritis)  Bilateral Eustachian tube dysfunction  Bilateral serous otitis media  Bilateral tinnitus   Left mixed hearing loss, right mild SNHL  Right cerumen impaction - removed    PLAN:  -Avoid q-tips  -Start Nasonex nasal spray, 2 sprays daily to each nostril, may take up to 6 weeks weeks of consistent use to take effect. Proper application discussed.   -Will send patient BPPV home exercises via MyChart  -Follow-up in 6 weeks. Will perform nasopharyngoscopy at this time. If not improved, can consider myringotomy and tube placement as this has been a recurrent issue. If BPPV not resolved at that time consider PT. Will continue to observe for larger vertigo attacks. Situation reviewed with the patient in detail. Attention: This note has been scribed by Maribell Urrutia under the supervision of Vannesa Rapp MD.    Vannesa Rapp MD  Otology/Otolaryngology  [facility]   [address] [facility]  Phone [phone]  Fax [phone]

## 2023-10-02 ENCOUNTER — TELEPHONE (OUTPATIENT)
Dept: INTERNAL MEDICINE CLINIC | Facility: CLINIC | Age: 43
End: 2023-10-02

## 2023-10-02 ENCOUNTER — OFFICE VISIT (OUTPATIENT)
Dept: AUDIOLOGY | Facility: CLINIC | Age: 43
End: 2023-10-02

## 2023-10-02 ENCOUNTER — OFFICE VISIT (OUTPATIENT)
Dept: OTOLARYNGOLOGY | Facility: CLINIC | Age: 43
End: 2023-10-02

## 2023-10-02 VITALS — WEIGHT: 270 LBS | HEIGHT: 74 IN | BODY MASS INDEX: 34.65 KG/M2

## 2023-10-02 DIAGNOSIS — H65.06 RECURRENT ACUTE SEROUS OTITIS MEDIA OF BOTH EARS: ICD-10-CM

## 2023-10-02 DIAGNOSIS — H61.23 BILATERAL IMPACTED CERUMEN: ICD-10-CM

## 2023-10-02 DIAGNOSIS — H93.13 TINNITUS OF BOTH EARS: Primary | ICD-10-CM

## 2023-10-02 DIAGNOSIS — H90.6 MIXED HEARING LOSS, BILATERAL: Primary | ICD-10-CM

## 2023-10-02 DIAGNOSIS — H69.93 EUSTACHIAN TUBE DYSFUNCTION, BILATERAL: ICD-10-CM

## 2023-10-02 DIAGNOSIS — H90.A32 MIXED CONDUCTIVE AND SENSORINEURAL HEARING LOSS OF LEFT EAR WITH RESTRICTED HEARING OF RIGHT EAR: ICD-10-CM

## 2023-10-02 DIAGNOSIS — H81.12 BENIGN PAROXYSMAL POSITIONAL VERTIGO OF LEFT EAR: ICD-10-CM

## 2023-10-02 DIAGNOSIS — H66.93 OTITIS OF BOTH EARS: Primary | ICD-10-CM

## 2023-10-02 PROCEDURE — 92557 COMPREHENSIVE HEARING TEST: CPT | Performed by: AUDIOLOGIST

## 2023-10-02 PROCEDURE — 92567 TYMPANOMETRY: CPT | Performed by: AUDIOLOGIST

## 2023-10-02 RX ORDER — MOMETASONE FUROATE 50 UG/1
1 SPRAY, METERED NASAL 2 TIMES DAILY
Qty: 17 G | Refills: 0 | Status: SHIPPED | OUTPATIENT
Start: 2023-10-02

## 2023-10-02 NOTE — TELEPHONE ENCOUNTER
Pt needs referral for ENT otitis bilateral , referral entered verbal from Dr Carrie Cruz . Pt notified .

## 2023-10-24 DIAGNOSIS — H69.93 EUSTACHIAN TUBE DYSFUNCTION, BILATERAL: ICD-10-CM

## 2023-10-24 RX ORDER — MOMETASONE FUROATE 50 UG/1
1 SPRAY, METERED NASAL 2 TIMES DAILY
Qty: 17 G | Refills: 0 | Status: SHIPPED | OUTPATIENT
Start: 2023-10-24

## 2023-10-24 NOTE — TELEPHONE ENCOUNTER
Current Outpatient Medications:     mometasone furoate 50 MCG/ACT Nasal Suspension, 1 spray by Nasal route 2 (two) times daily. , Disp: 17 g, Rfl: 0

## 2023-10-24 NOTE — TELEPHONE ENCOUNTER
This refill request is being sent to the provider for the following reason:  []Patient has not had an appointment within the past 12 months but has made an appointment on: ___  [x]Medication is not within protocol  []Patient did not complete follow up recommendations  []Other: ___  Dr. Radha Khanies, please review and send. Thank you.

## 2023-11-12 NOTE — PROGRESS NOTES
RETURNING PATIENT PROGRESS NOTE  OTOLOGY/OTOLARYNGOLOGY    REF MD:  No referring provider defined for this encounter. PCP: Inocencia Gracia MD    CHIEF COMPLAINT:    Chief Complaint   Patient presents with    Follow - Up     Patient is here for tinnitus of both ears, pt reports improvement. LAST VISIT 10/2/23  IMPRESSION:  Possible mild left vestibular neuritis - largely resolved (large vertigo attack)   BPPV - left posterior canalithiasis (subsequent to vestibular neuritis)  Bilateral Eustachian tube dysfunction  Bilateral serous otitis media  Bilateral tinnitus   Left mixed hearing loss, right mild SNHL  Right cerumen impaction - removed    PLAN:  -Avoid q-tips  -Start Nasonex nasal spray, 2 sprays daily to each nostril, may take up to 6 weeks weeks of consistent use to take effect. Proper application discussed.   -Will send patient BPPV home exercises via [employer]  -Follow-up in 6 weeks. Will perform nasopharyngoscopy at this time. If not improved, can consider myringotomy and tube placement as this has been a recurrent issue. If BPPV not resolved at that time consider PT. Will continue to observe for larger vertigo attacks. ________________________________________________________    INTERVAL HX: Patient notes significant improvement in ear congestion. Both ears have been less congested for weeks with occasional congestive episodes. Patient has been using Nasanex spray. Patient feels hearing is improved. Vertigo is resolving. Only occurs when laying down on one side. HISTORY OF PRESENT ILLNESS: Erickson Hudson is a 39year old male who presents for evaluation of bilateral ear fullness and tinnitus. Endorses history of ear infections, Eustachian tube dysfunction and fluid building up in ears when sick. Had PE tubes as a kid. Last myringotomy was 20 years ago. History of cerumen impactions. Today, denies vertigo. Congestion is improving.     Went to ED on 9/20/23 for dizziness upon waking up, described as \"room spinning,\" worse with head movements, lasting several hours along with ongoing sinus infection. Normal MRI brain and CT head. Improved with IV valium and other anti-emetics. Discharged with diagnosis of BPPV and instructed to use Sudafed. Had intermittent brief episodes of vertigo, but less severe than first attack and meclizine alleviated symptoms. Went to PCP on 9/25/23 for new-onset left ear pain and fullness. Diagnosed with bilateral acute otitis media and discharged with Neomycin otic drops and 7 days of Augmentin, which he just finished. Endorses history of ear infections. PAST MEDICAL HISTORY:    Past Medical History:   Diagnosis Date    Diabetes ([facility] [address]     Hyperlipidemia     Tonsillitis     Unspecified sleep apnea        PAST SURGICAL HISTORY:    Past Surgical History:   Procedure Laterality Date    TONSILLECTOMY  2000       Current Outpatient Medications on File Prior to Visit   Medication Sig Dispense Refill    mometasone furoate 50 MCG/ACT Nasal Suspension 1 spray by Nasal route 2 (two) times daily. 17 g 0    meclizine 25 MG Oral Tab Take 1 tablet (25 mg total) by mouth 3 (three) times daily as needed for Dizziness. 30 tablet 0    metFORMIN  MG Oral Tablet 24 Hr Take 2 tablets (1,000 mg total) by mouth 2 (two) times daily with meals. 180 tablet 3    semaglutide 8 MG/3ML Subcutaneous Solution Pen-injector Inject 2 mg into the skin once a week. 9 mL 1    lisinopril 5 MG Oral Tab Take 1 tablet (5 mg total) by mouth daily. 90 tablet 1    aspirin 81 MG Oral Tab EC TAKE 1 TABLET BY MOUTH EVERY DAY 90 tablet 1    Glucose Blood (CONTOUR NEXT TEST) In Vitro Strip Test twice a day 200 each 3    atorvastatin 10 MG Oral Tab Take 0.5 tablets (5 mg total) by mouth nightly.  90 tablet 1    Insulin Pen Needle (BD PEN NEEDLE MINI U/F) 31G X 5 MM Does not apply Misc USE AS DIRECTED 100 each 3    ACCU-CHEK SOFTCLIX LANCETS Does not apply Misc Test glucose twice daily 200 each 3     No current facility-administered medications on file prior to visit. Allergies: No Known Allergies    SOCIAL HISTORY:    Social History     Tobacco Use    Smoking status: Former     Passive exposure: Past    Smokeless tobacco: Never   Substance Use Topics    Alcohol use: Yes     Alcohol/week: 0.0 standard drinks of alcohol     Comment: YES, per [employer] beer and liquor occ       FAMILY HISTORY: Denies known family history of hearing loss, tinnitus, vertigo, or migraine. Denies known family history of head and neck cancer, thyroid cancer, bleeding disorders. REVIEW OF SYSTEMS:   Positives are in bold  Neuro: Headache, facial weakness, facial numbness, neck pain, vertigo  ENT: Hearing change, tinnitus, otorrhea, otalgia, aural fullness, ear pressure, vertigo, imbalance  Sinus pressure, rhinorrhea, congestion, facial pain, jaw pain, dysphagia, odynophagia, sore throat, voice changes, shortness of breath      EXAMINATION:  I washed my hands with an alcohol-based hand gel prior to examination  Constitutional:   --Vitals: Height 6' 1\" (1.854 m), weight 270 lb (122.5 kg). --General: no apparent distress, well-developed, conversant  Psych: affect pleasant and appropriate for age, alert and oriented  Neuro: Facial movement normal bilateral  Respiratory: No stridor, stertor or increased work of breathing  ENT:  --Nose: no external nasal deformity, anterior rhinoscopy: Bilateral nasal septal deviation, bilateral inferior turbinate hypertrophy - worse on right than left, mucosa edematous, no rhinorrhea  --Ear: The bilateral ears were examined under binocular microscopy  Right ear microscopic exam:  Pinna: Normal, no lesions or masses. Mastoid: Nontender on palpation. External auditory canal: Clear, no masses or lesions. Tympanic membrane: Intact, no lesions, normal landmarks. Middle ear: Serous otitis media    Left ear microscopic exam:  Pinna: Normal, no lesions or masses. Mastoid: Nontender on palpation.    External auditory canal: Edematous, erythematous. Otic drop debris - suctioned. Clear no masses or lesions  Tympanic membrane: Intact, no lesions, normal landmarks. Middle ear: Opacified with effusion      Vestibular examination (11/13/23): Performed with infrared frenzel goggles  Hallpike right: negative, Hallpike left: rotatory geotropic nystagmus    Nasopharyngoscopy 11/13/23:  Informed consent obtained, patient was correctly identified  Topical anesthesia with aerosolized lidocaine and ephedrine spray in the bilateral nares  Findings: The flexible scope was advanced into the bilateral  nares via the inferior meatus into the nasopharynx. No masses or lesions noted in the bilateral inferior meatus. Bilateral eustachian tubes obstructed by adenoid tissue. Complications none, procedure well tolerated. Adenoid hypertrophy. Right: inferior turbinate hypertrophy, septal deviation, mucosa edematous, unable to pass scope into nasopharynx Left: septal deviation, inferior turbinate hypertrophy, mucosa edematous, able to pass into nasopharynx but obstructed by adenoid hypertrophy       Latest Audiogram Result (Hz) Exam performed: 10/2/2023 9:33 AM Last edited by TORY Lainez on 10/2/2023 9:46 AM        125 250  1500 2000 3000 4000 6000 8000    Right air:  25 25  20  20 30 35  35    Left air:  30 35  40  45  45  45    Right mastoid bone:   25  15  20  30      Left mastoid bone (masked):   20  20  35  30      Masking right (mastoid bone):   50  40  50  55         Reliability:  Good    Transducer:   Inserts    Technique:  Conventional Audiometry    Comments:            Latest Speech Audiometry  Last edited by TORY Lainez on 10/2/2023 9:46 AM       Ear Method PTA SAT SRT [facility] Test/list Score (%) Intensity Mask/noise Notes    right live voice   25   10 By Difficulty 96 55      left live voice   35   10 By Difficulty 96 70 50                   Latest Tympanogram Result       Probe Tone (Hz): 226 Exam performed: 10/2/2023 9:35 AM Last edited by TORY eHndrix on 10/2/2023 9:46 AM      Tympanograms  These were drawn by a user, not generated from device data      Right Ear Left Ear                     Right Ear Left Ear    Tympanogram type: Type As Type B    Canal volume (mL): 1.3 1.2    Peak pressure (daPa): -179     Peak amplitude (mL): 0.2     Tympanogram width (daPa): Comments:                      CT head w/o contrast 9/20/23  CONCLUSION: No acute intracranial process. 14 mm diameter extra-axial calcification peripheral to the right frontal-temporal lobe likely representing a calcified meningioma, and this finding can otherwise be better characterized with MRI. MRI brain w/o contrast 9/20/23  CONCLUSION:   1. No acute intracranial process. No evidence of acute or subacute infarct. 2.  Calcified extra-axial focus along the lateral aspect of the right frontal lobe, compatible with meningioma. 3.  Mild chronic inflammation in the right frontal and ethmoid sinuses. ASSESSMENT/PLAN:  Keven Pearce is a 39year old male with     ICD-10-CM   1. Recurrent acute serous otitis media of both ears  H65.06   2. Non-seasonal allergic rhinitis due to other allergic trigger  J30.89   3. Dysfunction of both eustachian tubes  H69.93   4. Bilateral tinnitus  H93.13   5. Mixed conductive and sensorineural hearing loss of left ear with unrestricted hearing of right ear  H90.72   6. Sensorineural hearing loss (SNHL) of right ear with unrestricted hearing of left ear  H90.41   7. Acute vestibular neuritis, left  H81.22   8. Benign paroxysmal positional vertigo of left ear  H81.12   9. Adenoid hypertrophy [J35.2]  J35.2   10.  Nasal septal deviation [J34.2]  J34.2        IMPRESSION:  Possible mild left vestibular neuritis - largely resolved (large vertigo attack)   BPPV - left posterior canalithiasis (subsequent to vestibular neuritis) - nearly resolved   Bilateral Eustachian tube dysfunction  Bilateral serous otitis media  Bilateral tinnitus   Left mixed hearing loss, right mild SNHL  Nasal obstruction due to bilateral nasal septal deviation, inferior turbinate hypertrophy, and adenoid hypertrophy     PLAN:  -Avoid q-tips  -Continue Nasonex nasal spray, 2 sprays daily to each nostril. Proper application discussed.   -Patient recommended to do BPPV home exercises sent via [employer]  -Referred to Dr. Lena Baldwin for follow up of nasal obstruction  -Consider simultaneous myringotomy and tube placement with nasal intervention  -Follow-up as needed    Situation reviewed with the patient in detail. Attention: This note has been scribed by Sonia Rodriguez under the supervision of Vannesa Rapp MD.     Vannesa Rapp MD  Otology/Otolaryngology  [facility]   [address] [address] [facility]  Phone [phone]  Fax [phone]     I have personally performed the services described in this documentation. All medical record entries made by the scribe were at my direction and in my presence. I have reviewed the chart and agree that the medical record reflects my personal performance and is accurate and complete.

## 2023-11-13 ENCOUNTER — OFFICE VISIT (OUTPATIENT)
Dept: OTOLARYNGOLOGY | Facility: CLINIC | Age: 43
End: 2023-11-13
Payer: COMMERCIAL

## 2023-11-13 VITALS — BODY MASS INDEX: 35.78 KG/M2 | WEIGHT: 270 LBS | HEIGHT: 73 IN

## 2023-11-13 DIAGNOSIS — H81.12 BENIGN PAROXYSMAL POSITIONAL VERTIGO OF LEFT EAR: ICD-10-CM

## 2023-11-13 DIAGNOSIS — J34.2 NASAL SEPTAL DEVIATION: ICD-10-CM

## 2023-11-13 DIAGNOSIS — H69.93 DYSFUNCTION OF BOTH EUSTACHIAN TUBES: ICD-10-CM

## 2023-11-13 DIAGNOSIS — H65.06 RECURRENT ACUTE SEROUS OTITIS MEDIA OF BOTH EARS: Primary | ICD-10-CM

## 2023-11-13 DIAGNOSIS — J30.89 NON-SEASONAL ALLERGIC RHINITIS DUE TO OTHER ALLERGIC TRIGGER: ICD-10-CM

## 2023-11-13 DIAGNOSIS — H81.22: ICD-10-CM

## 2023-11-13 DIAGNOSIS — H90.72 MIXED CONDUCTIVE AND SENSORINEURAL HEARING LOSS OF LEFT EAR WITH UNRESTRICTED HEARING OF RIGHT EAR: ICD-10-CM

## 2023-11-13 DIAGNOSIS — H93.13 BILATERAL TINNITUS: ICD-10-CM

## 2023-11-13 DIAGNOSIS — H90.41 SENSORINEURAL HEARING LOSS (SNHL) OF RIGHT EAR WITH UNRESTRICTED HEARING OF LEFT EAR: ICD-10-CM

## 2023-11-13 DIAGNOSIS — J35.2 ADENOID HYPERTROPHY: ICD-10-CM

## 2023-11-16 ENCOUNTER — TELEPHONE (OUTPATIENT)
Dept: OTOLARYNGOLOGY | Facility: CLINIC | Age: 43
End: 2023-11-16

## 2023-11-16 DIAGNOSIS — H69.93 EUSTACHIAN TUBE DYSFUNCTION, BILATERAL: ICD-10-CM

## 2023-11-16 RX ORDER — PERPHENAZINE 16 MG/1
TABLET, FILM COATED ORAL
Qty: 200 EACH | Refills: 3 | Status: SHIPPED | OUTPATIENT
Start: 2023-11-16

## 2023-11-16 NOTE — TELEPHONE ENCOUNTER
Refill passed per [employer], [facility] protocol.   Requested Prescriptions   Pending Prescriptions Disp Refills    Glucose Blood (CONTOUR NEXT TEST) In Vitro Strip 200 each 3     Sig: Test twice a day       Diabetic Supplies Protocol Passed - 11/16/2023  8:04 AM        Passed - In person appointment or virtual visit in the past 12 mos or appointment in next 3 mos     Recent Outpatient Visits              3 days ago Recurrent acute serous otitis media of both Arnoldo Samples, [address], [place] Mcallister MD    Office Visit    1 month ago Mixed hearing loss, bilateral    [address], [facility], [facility]    Office Visit    1 month ago Tinnitus of both Arnoldo Samples, [address], [place] Mcallister MD    Office Visit    1 month ago Acute suppurative otitis media of both ears without spontaneous rupture of tympanic membranes, recurrence not specified    [facility], [facility], [address], Cindy Mcclure Jr., Reta Ortiz, APREAL    Office Visit    1 month ago Physical exam    [address] [facility], Winifred Klinefelter, MD    Office Visit                         Recent Outpatient Visits              3 days ago Recurrent acute serous otitis media of both Arnoldo Samples, [address], [place] Mcallister MD    Office Visit    1 month ago Mixed hearing loss, bilateral    [address], [facility], [facility]    Office Visit    1 month ago Tinnitus of both Arnoldo Samples, [address], [place] Mcallister MD    Office Visit    1 month ago Acute suppurative otitis media of both ears without spontaneous rupture of tympanic membranes, recurrence not specified    [facility], [facility], [address], Cindy Mcclure Jr., Reta Ortiz, APN    Office Visit    1 month ago Physical exam    [address] [facility], Reese Peters MD    Office Visit

## 2023-11-16 NOTE — TELEPHONE ENCOUNTER
----- Message from Paula Sharma DO sent at 11/15/2023  2:39 PM CST -----  Regarding: Patient Referral  Hello,    This patient was referred to me by Dr. Pelon Rodrigez, just want to make sure he gets a visit scheduled with me. Thank You!     [address]

## 2023-11-16 NOTE — TELEPHONE ENCOUNTER
Attempted to reach patient and LMTCB, patient referred to see dr Kiara Wall for follow up by dr. Murguia Cons

## 2023-11-17 RX ORDER — MOMETASONE FUROATE 50 UG/1
1 SPRAY, METERED NASAL 2 TIMES DAILY
Qty: 17 G | Refills: 0 | Status: SHIPPED | OUTPATIENT
Start: 2023-11-17

## 2023-11-17 NOTE — TELEPHONE ENCOUNTER
This refill request is being sent to the provider for the following reason:  []Patient has not had an appointment within the past 12 months but has made an appointment on: ___  [x]Medication is not within protocol  []Patient did not complete follow up recommendations  []Other: ___  Dr. Lamin Zaidi, please review and send. Thank you.

## 2023-11-22 NOTE — TELEPHONE ENCOUNTER
Pharmacy is requesting refill      aspirin 81 MG Oral Tab EC, TAKE 1 TABLET BY MOUTH EVERY DAY, Disp: 90 tablet, Rfl: 1      lisinopril 5 MG Oral Tab, Take 1 tablet (5 mg total) by mouth daily. , Disp: 90 tablet, Rfl: 1

## 2023-11-23 RX ORDER — LISINOPRIL 5 MG/1
5 TABLET ORAL DAILY
Qty: 90 TABLET | Refills: 3 | Status: SHIPPED | OUTPATIENT
Start: 2023-11-23

## 2023-11-23 RX ORDER — ASPIRIN 81 MG/1
TABLET ORAL
Qty: 90 TABLET | Refills: 3 | Status: SHIPPED | OUTPATIENT
Start: 2023-11-23

## 2023-11-23 NOTE — TELEPHONE ENCOUNTER
Refill passed per [employer], [facility] protocol. Requested Prescriptions   Pending Prescriptions Disp Refills    aspirin 81 MG Oral Tab EC 90 tablet 1     Sig: TAKE 1 TABLET BY MOUTH EVERY DAY       Aspirin Protocol Passed - 11/22/2023  2:21 PM        Passed - In person appointment or virtual visit in the past 6 mos or appointment in next 3 mos     Recent Outpatient Visits              1 week ago Recurrent acute serous otitis media of both Jame Davidson MD    Office Visit    1 month ago Mixed hearing loss, bilateral    [address] [facility], [facility]    Office Visit    1 month ago Tinnitus of both Matthew Kovacs, [address], [place] Mcallister MD    Office Visit    1 month ago Acute suppurative otitis media of both ears without spontaneous rupture of tympanic membranes, recurrence not specified    [facility], [facility], [address], Cindy Mcclure Jr., JIMMIE Hameed    Office Visit    2 months ago Physical exam    Alexandr Benito MD    Office Visit                        lisinopril 5 MG Oral Tab 90 tablet 1     Sig: Take 1 tablet (5 mg total) by mouth daily.        Hypertensive Medications Protocol Passed - 11/22/2023  2:21 PM        Passed - In person appointment in the past 12 or next 3 months     Recent Outpatient Visits              1 week ago Recurrent acute serous otitis media of both Jame Davidson MD    Office Visit    1 month ago Mixed hearing loss, bilateral    [facility], [address], [facility]    Office Visit    1 month ago Tinnitus of both Jame Davidson MD    Office Visit    1 month ago Acute suppurative otitis media of both ears without spontaneous rupture of tympanic membranes, recurrence not specified    [facility], [facility], [address], [place] Kami Waite, JIMMIE Garcia    Office Visit    2 months ago Physical exam    Juli Arredondo MD    Office Visit                      Passed - Last BP reading less than 140/90     BP Readings from Last 1 Encounters:   09/25/23 112/70               Passed - CMP or BMP in past 6 months     Recent Results (from the past 4392 hour(s))   Basic Metabolic Panel (8)    Collection Time: 09/20/23  4:53 AM   Result Value Ref Range    Glucose 243 (H) 70 - 99 mg/dL    Sodium 140 136 - 145 mmol/L    Potassium 3.8 3.5 - 5.1 mmol/L    Chloride 106 98 - 112 mmol/L    CO2 25.0 21.0 - 32.0 mmol/L    Anion Gap 9 0 - 18 mmol/L    BUN 16 7 - 18 mg/dL    Creatinine 0.94 0.70 - 1.30 mg/dL    BUN/CREA Ratio 17.0 10.0 - 20.0    Calcium, Total 9.0 8.5 - 10.1 mg/dL    Calculated Osmolality 299 (H) 275 - 295 mOsm/kg    eGFR-Cr 103 >=60 mL/min/1.73m2     *Note: Due to a large number of results and/or encounters for the requested time period, some results have not been displayed. A complete set of results can be found in Results Review.                Passed - In person appointment or virtual visit in the past 6 months     Recent Outpatient Visits              1 week ago Recurrent acute serous otitis media of both Inna Lima MD    Office Visit    1 month ago Mixed hearing loss, bilateral    [facility], [address], [facility], [facility]    Office Visit    1 month ago Tinnitus of both Inna Lima MD    Office Visit    1 month ago Acute suppurative otitis media of both ears without spontaneous rupture of tympanic membranes, recurrence not specified [facility], [facility], [address], [place]    Office Visit    2 months ago Physical exam    Norma Guan MD    Office Visit                      Passed - [facility] or GFRNAA > 50     GFR Evaluation  EGFRCR: 103 , resulted on 9/20/2023               Recent Outpatient Visits              1 week ago Recurrent acute serous otitis media of both Bronwyn Zhao MD    Office Visit    1 month ago Mixed hearing loss, bilateral    [address], [address], [facility]    Office Visit    1 month ago Tinnitus of both Priyank Imam, [address], Evaristo Mcallister MD    Office Visit    1 month ago Acute suppurative otitis media of both ears without spontaneous rupture of tympanic membranes, recurrence not specified    [facility], [address], [address], Cindy Mcclure Jr., JIMMIE Pop    Office Visit    2 months ago Physical exam    Navin Aranda MD    Office Visit

## 2023-12-28 DIAGNOSIS — H69.93 EUSTACHIAN TUBE DYSFUNCTION, BILATERAL: ICD-10-CM

## 2023-12-28 NOTE — TELEPHONE ENCOUNTER
Current Outpatient Medications:     mometasone furoate 50 MCG/ACT Nasal Suspension, 1 spray by Nasal route 2 (two) times daily., Disp: 17 g, Rfl: 0

## 2024-01-02 RX ORDER — MOMETASONE FUROATE 50 UG/1
1 SPRAY, METERED NASAL 2 TIMES DAILY
Qty: 17 G | Refills: 0 | Status: SHIPPED | OUTPATIENT
Start: 2024-01-02

## 2024-01-02 NOTE — TELEPHONE ENCOUNTER
This refill request is being sent to the provider for the following reason:  []Patient has not had an appointment within the past 12 months but has made an appointment on: ___  []Medication is not within protocol  []Patient did not complete follow up recommendations  [x]Other: ___     Doctor please review and sign.

## 2024-01-11 RX ORDER — LISINOPRIL 5 MG/1
5 TABLET ORAL DAILY
Qty: 90 TABLET | Refills: 3 | OUTPATIENT
Start: 2024-01-11

## 2024-01-11 RX ORDER — ASPIRIN 81 MG/1
TABLET ORAL
Qty: 90 TABLET | Refills: 3 | OUTPATIENT
Start: 2024-01-11

## 2024-01-17 ENCOUNTER — TELEPHONE (OUTPATIENT)
Dept: OTOLARYNGOLOGY | Facility: CLINIC | Age: 44
End: 2024-01-17

## 2024-01-17 DIAGNOSIS — H69.93 EUSTACHIAN TUBE DYSFUNCTION, BILATERAL: ICD-10-CM

## 2024-01-17 RX ORDER — MOMETASONE FUROATE 50 UG/1
1 SPRAY, METERED NASAL 2 TIMES DAILY
Qty: 3 EACH | Refills: 0 | Status: SHIPPED | OUTPATIENT
Start: 2024-01-17

## 2024-01-17 NOTE — TELEPHONE ENCOUNTER
90 DAY PRESCRIPTION REQUEST FOR     MOMETASONE FUROATE 50 MCG SPRY  USE 1 SPRAY INTO EACH NOSTRIL TWICE A DAY

## 2024-01-25 ENCOUNTER — TELEPHONE (OUTPATIENT)
Dept: FAMILY MEDICINE CLINIC | Facility: CLINIC | Age: 44
End: 2024-01-25

## 2024-01-25 NOTE — TELEPHONE ENCOUNTER
Left detailed vm for patient to call back and schedule DM eye exam, also provided information on how to fax results if done at another facility. A [employer] message was also sent with this information.

## 2024-02-12 RX ORDER — METFORMIN HYDROCHLORIDE 500 MG/1
1000 TABLET, EXTENDED RELEASE ORAL 2 TIMES DAILY WITH MEALS
Qty: 360 TABLET | Refills: 3 | Status: SHIPPED | OUTPATIENT
Start: 2024-02-12

## 2024-02-12 NOTE — TELEPHONE ENCOUNTER
Please review; protocol failed/No Protocol    Sent [employer] message to patient to complete labs from 09/2023.      Requested Prescriptions   Pending Prescriptions Disp Refills    METFORMIN  MG Oral Tablet 24 Hr [Pharmacy Med Name: METFORMIN HCL  MG TABLET] 360 tablet 1     Sig: TAKE 2 TABLETS BY MOUTH TWICE A DAY WITH MEALS       Diabetes Medication Protocol Failed - 2/11/2024  2:01 AM        Failed - Last A1C < 7.5 and within past 6 months     Lab Results   Component Value Date    A1C 7.1 (A) 06/28/2023             Passed - In person appointment or virtual visit in the past 6 mos or appointment in next 3 mos     Recent Outpatient Visits              3 months ago Recurrent acute serous otitis media of both ears    [facility] [place] Davis [place]    Office Visit    4 months ago Mixed hearing loss, bilateral    [facility], [place]    Office Visit    4 months ago Tinnitus of both ears    [facility] [place]    Office Visit    4 months ago Acute suppurative otitis media of both ears without spontaneous rupture of tympanic membranes, recurrence not specified    [facility], [facility], [facility] Oumar Mcclure Jr., APN    Office Visit    4 months ago Physical exam    [facility] [place], Pippa Benjamin MD    Office Visit                      Passed - Microalbumin procedure in past 12 months or taking ACE/ARB        Passed - EGFRCR or GFRNAA > 50     GFR Evaluation  EGFRCR: 103 , resulted on 9/20/2023          Passed - GFR in the past 12 months             Recent Outpatient Visits              3 months ago Recurrent acute serous otitis media of both ears    [facility] Juan Francisco Rendon MD    Office Visit    4 months ago Mixed hearing loss,  bilateral    [facility], [facility], [place]    Office Visit    4 months ago [facility]itus of both ears    [facility]    Office Visit    4 months ago Acute suppurative otitis media of both ears without spontaneous rupture of tympanic membranes, recurrence not specified    [facility], [facility], [facility], [place] Oumar Mcclure Jr., APN    Office Visit    4 months ago Physical exam    [facility], Pippa Benjamin MD    Office Visit

## 2024-02-21 RX ORDER — LISINOPRIL 5 MG/1
5 TABLET ORAL DAILY
Qty: 90 TABLET | Refills: 3 | OUTPATIENT
Start: 2024-02-21

## 2024-02-21 RX ORDER — ASPIRIN 81 MG/1
TABLET ORAL
Qty: 90 TABLET | Refills: 3 | OUTPATIENT
Start: 2024-02-21

## 2024-03-04 ENCOUNTER — PATIENT MESSAGE (OUTPATIENT)
Dept: INTERNAL MEDICINE CLINIC | Facility: CLINIC | Age: 44
End: 2024-03-04

## 2024-03-04 NOTE — TELEPHONE ENCOUNTER
From: Mark Kimble  To: Pippa Tejada  Sent: 3/4/2024 8:06 AM CST  Subject: Testing supplies     Hi following up on a previous message. Looks like preferred brand is one touch ultra 2 kit, I will [facility] monitor[facility] strips [facility] Thank you- Eliseo

## 2024-03-05 ENCOUNTER — PATIENT MESSAGE (OUTPATIENT)
Dept: INTERNAL MEDICINE CLINIC | Facility: CLINIC | Age: 44
End: 2024-03-05

## 2024-03-05 DIAGNOSIS — L91.8 MULTIPLE ACQUIRED SKIN TAGS: Primary | ICD-10-CM

## 2024-03-05 RX ORDER — LANCETS 30 GAUGE
EACH MISCELLANEOUS
Qty: 200 EACH | Refills: 3 | Status: SHIPPED | OUTPATIENT
Start: 2024-03-05

## 2024-03-05 RX ORDER — BLOOD SUGAR DIAGNOSTIC
STRIP MISCELLANEOUS
Qty: 200 EACH | Refills: 3 | Status: SHIPPED | OUTPATIENT
Start: 2024-03-05

## 2024-03-05 RX ORDER — BLOOD-GLUCOSE METER
EACH MISCELLANEOUS
Qty: 1 KIT | Refills: 0 | Status: SHIPPED | OUTPATIENT
Start: 2024-03-05

## 2024-03-06 NOTE — TELEPHONE ENCOUNTER
Refill passed per [facility] protocol.      Requested Prescriptions   Pending Prescriptions Disp Refills    Glucose Blood (ONETOUCH ULTRA) In Vitro Strip 200 each 11     Sig: To test twice daily       Diabetic Supplies Protocol Passed - 3/4/2024  4:33 PM        Passed - In person appointment or virtual visit in the past 12 mos or appointment in next 3 mos     Recent Outpatient Visits              3 months ago Recurrent acute serous otitis media of both ears    [facility] Juan Francisco Davis MD    Office Visit    5 months ago Mixed hearing loss, bilateral    [facility] Danitza Fuentes AUD    Office Visit    5 months ago Tinnitus of both ears    [facility] Juan Francisco Davis MD    Office Visit    5 months ago Acute suppurative otitis media of both ears without spontaneous rupture of tympanic membranes, recurrence not specified    [facility] APN    Office Visit    5 months ago Physical exam    [facility], Pippa Benjamin MD    Office Visit                        Blood Glucose Monitoring Suppl (ONETOUCH ULTRA 2) w/Device Does not apply Kit 1 kit 0     Sig: Use as directed twice daily       Diabetic Supplies Protocol Passed - 3/4/2024  4:33 PM        Passed - In person appointment or virtual visit in the past 12 mos or appointment in next 3 mos     Recent Outpatient Visits              3 months ago Recurrent acute serous otitis media of both ears    [facility]    Office Visit    5 months ago Mixed hearing loss, bilateral    [facility]    [facility] Visit    5 months ago Tinnitus of both ears    [facility],  Juan Francisco Rendon MD    Office Visit    5 months ago Acute suppurative otitis media of both ears without spontaneous rupture of tympanic membranes, recurrence not specified    [facility], Walk-In [facility], Oumar Saba Jr., APN    Office Visit    5 months ago Physical exam    [facility], Pippa Benjamin MD    Office Visit                        Lancets Does not apply Misc 200 each 11     Sig: Use as directed twice daily       Diabetic Supplies Protocol Passed - 3/4/2024  4:33 PM        Passed - In person appointment or virtual visit in the past 12 mos or appointment in next 3 mos     Recent Outpatient Visits              3 months ago Recurrent acute serous otitis media of both ears    [facility] Juan Francisco Rendon MD    Office Visit    5 months ago Mixed hearing loss, bilateral    [facility], Danitza López AUD    Office Visit    5 months ago Tinnitus of both ears    [facility] Juan Francisco Rendon MD    Office Visit    5 months ago Acute suppurative otitis media of both ears without spontaneous rupture of tympanic membranes, recurrence not specified    [facility], [facility], Oumar Saba Jr., APN    Office Visit    5 months ago Physical exam    [facility], Pippa Benjamin MD    Office Visit                                Recent Outpatient Visits              3 months ago Recurrent acute serous otitis media of both ears    [facility] Juan Francisco Rendon MD    Office Visit    5 months ago Mixed hearing loss, bilateral    [facility], Danitza López AUD    Office Visit    5 months ago Tinnitus of both ears    [place]  [facility]    Office Visit    5 months ago Acute suppurative otitis media of both ears without spontaneous rupture of tympanic membranes, recurrence not specified    [facility] O[facility]clure Jr., APN    Office Visit    5 months ago Physical exam    [facility], [facility], Pippa Benjamin MD    Office Visit

## 2024-03-06 NOTE — TELEPHONE ENCOUNTER
Dr Tejada=see message,pended referral, please check dx code before signing .     No future appointments.      From: Mark Kimble  To: Pippa Tejada  Sent: 3/5/2024  8:27 AM CST  Subject: Derm referral     Good morning,  I have skin tags that I would like removed, can I please get a referral to derm?   Thank you   Eliseo

## 2024-03-15 DIAGNOSIS — E78.5 HYPERLIPIDEMIA, UNSPECIFIED HYPERLIPIDEMIA TYPE: ICD-10-CM

## 2024-03-18 RX ORDER — ATORVASTATIN CALCIUM 10 MG/1
5 TABLET, FILM COATED ORAL NIGHTLY
Qty: 45 TABLET | Refills: 0 | Status: SHIPPED | OUTPATIENT
Start: 2024-03-18

## 2024-03-18 RX ORDER — PERPHENAZINE 16 MG/1
TABLET, FILM COATED ORAL
Qty: 200 EACH | Refills: 3 | OUTPATIENT
Start: 2024-03-18

## 2024-03-18 NOTE — TELEPHONE ENCOUNTER
Please review; protocol failed/ has no protocol    Message sent for patient to have labs done.    Requested Prescriptions   Pending Prescriptions Disp Refills    atorvastatin 10 MG Oral Tab 90 tablet 1     Sig: Take 0.5 tablets (5 mg total) by mouth nightly.       Cholesterol Medication Protocol Failed - 3/15/2024 10:37 PM        Failed - Lipid panel within past 12 months     Lab Results   Component Value Date    CHOLEST 136 06/14/2023    TRIG 263 (A) 06/14/2023    HDL 34 (A) 06/14/2023    LDL 49 06/14/2023    VLDL 53 (H) 09/09/2021    NONHDLC 146 (H) 09/09/2021             Passed - ALT < 80     Lab Results   Component Value Date    ALT 31 07/19/2023             Passed - ALT resulted within past year        Passed - In person appointment or virtual visit in the past 12 mos or appointment in next 3 mos     Recent Outpatient Visits              4 months ago Recurrent acute serous otitis media of both ears    [facility] Juan Francisco Davis MD    Office Visit    5 months ago Mixed hearing loss, bilateral    [facility], [place]    Office Visit    5 months ago [facility]innitus of both ears    [facility]    Office Visit    5 months ago Acute suppurative otitis media of both ears without spontaneous rupture of tympanic membranes, recurrence not specified    [facility], [facility], [facility], [place] Oumar Mcclure Jr., APN    Office Visit    6 months ago Physical exam    [facility] Pippa Benjamin MD    Office Visit                       Refused Prescriptions Disp Refills    Glucose Blood (CONTOUR NEXT TEST) In Vitro Strip 200 each 3     Sig: Test twice a day       Diabetic Supplies Protocol Passed - 3/15/2024 10:37 PM        Passed - In person appointment or virtual visit in the past 12 mos or  appointment in next 3 mos     Recent Outpatient Visits              4 months ago Recurrent acute serous otitis media of both ears    [facility], [facility], Juan Francisco Rendon MD    Office Visit    5 months ago Mixed hearing loss, bilateral    [facility], [facility], Danitza López, TORY    Office Visit    5 months ago Tinnitus of both ears    [facility], Juan Francisco Rendon MD    Office Visit    5 months ago Acute suppurative otitis media of both ears without spontaneous rupture of tympanic membranes, recurrence not specified    [facility], [facility]In [facility], [facility], Oumar Saba Jr., APN    Office Visit    6 months ago Physical exam    [facility], Pippa Benjamin MD    Office Visit                         Recent Outpatient Visits              4 months ago Recurrent acute serous otitis media of both ears    [facility], Juan Francisco Rendon MD    Office Visit    5 months ago Mixed hearing loss, bilateral    [facility], [facility], Danitza López, TORY    Office Visit    5 months ago Tinnitus of both ears    [facility], Juan Francisco Rendon MD    Office Visit    5 months ago Acute suppurative otitis media of both ears without spontaneous rupture of tympanic membranes, recurrence not specified    [facility], [facility], [facility], Oumra Saba Jr., APN    Office Visit    6 months ago Physical exam    [facility], Pippa Benjamin MD    Office Visit

## 2024-04-17 RX ORDER — SEMAGLUTIDE 2.68 MG/ML
2 INJECTION, SOLUTION SUBCUTANEOUS WEEKLY
Qty: 3 ML | Refills: 1 | Status: SHIPPED | OUTPATIENT
Start: 2024-04-17

## 2024-04-17 NOTE — TELEPHONE ENCOUNTER
Please review. Rx failed/no protocol.    Message sent to patient about lab work needing to be done from 09/18/2023    Per OV 09/18/2023 with Dr. Tejada:   Discussed with endorcinology Leo Hernández   Will increase his ozempic   glucose in AM still 150  Follow up with endo    Requested Prescriptions   Pending Prescriptions Disp Refills    OZEMPIC, 2 MG/DOSE, 8 MG/3ML Subcutaneous Solution Pen-injector [Pharmacy Med Name: OZEMPIC 8 MG/3 ML (2 MG/DOSE)]  1     Sig: INJECT 2 MG INTO THE SKIN ONCE A WEEK.       Diabetes Medication Protocol Failed - 4/15/2024  9:26 PM        Failed - Last A1C < 7.5 and within past 6 months     Lab Results   Component Value Date    A1C 7.1 (A) 06/28/2023             Failed - In person appointment or virtual visit in the past 6 mos or appointment in next 3 mos     Recent Outpatient Visits              5 months ago Recurrent acute serous otitis media of both ears    [facility] Juan Francisco Davis MD    Office Visit    6 months ago Mixed hearing loss, bilateral    [facility]    Office Visit    6 months ago Tinnitus of both ears    [facility] Juan Francisco Davis MD    Office Visit    6 months ago Acute suppurative otitis media of both ears without spontaneous rupture of tympanic membranes, recurrence not specified    [facility] APN    Office Visit    7 months ago Physical exam    [facility], Pippa Benjamin MD    Office Visit                      Passed - Microalbumin procedure in past 12 months or taking ACE/ARB        Passed - EGFRCR or GFRNAA > 50     GFR Evaluation  EGFRCR: 103 , resulted on 9/20/2023          Passed - GFR in the past 12 months               Recent Outpatient Visits              5 months ago Recurrent  acute serous otitis media of both ears    [place]    Office Visit    6 months ago Mixed hearing loss, bilateral    [place], [facility], [place]    Office Visit    6 months ago Tinnitus of both ears    [facility]    Office Visit    6 months ago Acute suppurative otitis media of both ears without spontaneous rupture of tympanic membranes, recurrence not specified    [place], [facility], [facility], [place] APN    Office Visit    7 months ago Physical exam    [facility], Pippa Benjamin MD    Office Visit

## 2024-05-20 RX ORDER — SEMAGLUTIDE 2.68 MG/ML
2 INJECTION, SOLUTION SUBCUTANEOUS WEEKLY
Qty: 3 ML | Refills: 1 | OUTPATIENT
Start: 2024-05-20

## 2024-06-19 DIAGNOSIS — E78.5 HYPERLIPIDEMIA, UNSPECIFIED HYPERLIPIDEMIA TYPE: ICD-10-CM

## 2024-06-19 RX ORDER — ATORVASTATIN CALCIUM 10 MG/1
5 TABLET, FILM COATED ORAL NIGHTLY
Qty: 45 TABLET | Refills: 0 | Status: SHIPPED | OUTPATIENT
Start: 2024-06-19

## 2024-06-19 RX ORDER — SEMAGLUTIDE 2.68 MG/ML
2 INJECTION, SOLUTION SUBCUTANEOUS WEEKLY
Qty: 3 ML | Refills: 0 | Status: SHIPPED | OUTPATIENT
Start: 2024-06-19

## 2024-06-19 RX ORDER — SEMAGLUTIDE 2.68 MG/ML
2 INJECTION, SOLUTION SUBCUTANEOUS WEEKLY
Refills: 1 | OUTPATIENT
Start: 2024-06-19

## 2024-06-19 NOTE — TELEPHONE ENCOUNTER
Please review.  Protocol failed / Has no protocol.    [employer] message sent to patient to complete labs pended from last office visit 9/18/23.     Requested Prescriptions   Pending Prescriptions Disp Refills    ATORVASTATIN 10 MG Oral Tab [Pharmacy Med Name: ATORVASTATIN 10 MG TABLET] 45 tablet 0     Sig: TAKE 0.5 TABLETS BY MOUTH NIGHTLY.       Cholesterol Medication Protocol Failed - 6/19/2024 12:03 AM        Failed - Lipid panel within past 12 months     Lab Results   Component Value Date    CHOLEST 136 06/14/2023    TRIG 263 (A) 06/14/2023    HDL 34 (A) 06/14/2023    LDL 49 06/14/2023    VLDL 53 (H) 09/09/2021    NONHDLC 146 (H) 09/09/2021             Passed - ALT < 80     Lab Results   Component Value Date    ALT 31 07/19/2023             Passed - ALT resulted within past year        Passed - In person appointment or virtual visit in the past 12 mos or appointment in next 3 mos     Recent Outpatient Visits              7 months ago Recurrent acute serous otitis media of both ears    [facility] Juan Francisco Davis MD    Office Visit    8 months ago Mixed hearing loss, bilateral    [facility]    Office Visit    8 months ago Tinnitus of both ears    [facility]    Office Visit    8 months ago Acute suppurative otitis media of both ears without spontaneous rupture of tympanic membranes, recurrence not specified    [facility], [facility] Oumar Mcclure Jr., APN    Office Visit    9 months ago Physical exam    [facility]    [facility]          Future Appointments         Provider Department Appt Notes    Tomorrow Jerson Mayo MD [facility] Ear problem                       Future Appointments          Provider Department Appt Notes    Tomorrow Jerson Mayo MD [facility] Ear problem          Recent Outpatient Visits              7 months ago Recurrent acute serous otitis media of both ears    [facility] Juan Francisco Davis MD    Office Visit    8 months ago Mixed hearing loss, bilateral    [facility] Danitza Fuentes AUD    Office Visit    8 months ago Tinnitus of both ears    [facility] Juan Francisco Davis MD    Office Visit    8 months ago Acute suppurative otitis media of both ears without spontaneous rupture of tympanic membranes, recurrence not specified    [facility], [facility], [facility] Oumar Mcclure Jr., APN    Office Visit    9 months ago Physical exam    [facility], Pippa Benjamin MD    Office Visit

## 2024-06-19 NOTE — TELEPHONE ENCOUNTER
Please review.  Protocol failed / Has no protocol.    Marked High Priority, patient states out of medication    [employer] message sent to patient to complete labs pended from last office visit 9/18/23.    Requested Prescriptions   Pending Prescriptions Disp Refills    semaglutide (OZEMPIC, 2 MG/DOSE,) 8 MG/3ML Subcutaneous Solution Pen-injector 3 mL      Sig: Inject 2 mg into the skin once a week.       Diabetes Medication Protocol Failed - 6/17/2024  8:37 AM        Failed - Last A1C < 7.5 and within past 6 months     Lab Results   Component Value Date    A1C 7.1 (A) 06/28/2023             Failed - In person appointment or virtual visit in the past 6 mos or appointment in next 3 mos     Recent Outpatient Visits              7 months ago Recurrent acute serous otitis media of both ears    [facility]    Office Visit    8 months ago Mixed hearing loss, bilateral    [facility] Danitza Fuentes AUD    Office Visit    8 months ago Tinnitus of both ears    [facility]    Office Visit    8 months ago Acute suppurative otitis media of both ears without spontaneous rupture of tympanic membranes, recurrence not specified    [facility] O[facility]cl[facility]., APN    Office Visit    9 months ago Physical exam    [facility] Pippa Benjamin MD    Office Visit          Future Appointments         Provider Department Appt Notes    Tomorrow Jerson Mayo MD [facility] Ear problem                    Passed - Microalbumin procedure in past 12 months or taking ACE/ARB        Passed - EGFRCR or GFRNAA > 50     GFR Evaluation  EGFRCR: 103 , resulted on 9/20/2023          Passed - GFR in the past 12 months           Future  Appointments         Provider Department Appt Notes    Tomorrow Jerson Mayo MD [facility] Ear problem          Recent Outpatient Visits              7 months ago Recurrent acute serous otitis media of both ears    [facility]    Office Visit    8 months ago Mixed hearing loss, bilateral    [facility] Daintza Fuentes AUD    Office Visit    8 months ago Tinnitus of both ears    [facility] Juan Francisco Davis MD    Office Visit    8 months ago Acute suppurative otitis media of both ears without spontaneous rupture of tympanic membranes, recurrence not specified    [facility], [facility]In Clinic, [facility] Oumar Mcclure Jr., APN    Office Visit    9 months ago Physical exam    [facility], Pippa Benjamin MD    Office Visit

## 2024-06-20 ENCOUNTER — OFFICE VISIT (OUTPATIENT)
Dept: OTOLARYNGOLOGY | Facility: CLINIC | Age: 44
End: 2024-06-20

## 2024-06-20 DIAGNOSIS — H69.93 EUSTACHIAN TUBE DYSFUNCTION, BILATERAL: Primary | ICD-10-CM

## 2024-06-20 PROCEDURE — 99213 OFFICE O/P EST LOW 20 MIN: CPT | Performed by: OTOLARYNGOLOGY

## 2024-06-20 RX ORDER — PSEUDOEPHEDRINE HCL 120 MG/1
120 TABLET, FILM COATED, EXTENDED RELEASE ORAL EVERY 12 HOURS
Qty: 60 TABLET | Refills: 3 | Status: SHIPPED | OUTPATIENT
Start: 2024-06-20

## 2024-06-20 RX ORDER — METHYLPREDNISOLONE 4 MG/1
TABLET ORAL
Qty: 21 TABLET | Refills: 0 | Status: SHIPPED | OUTPATIENT
Start: 2024-06-20

## 2024-06-20 RX ORDER — FLUTICASONE PROPIONATE 50 MCG
1 SPRAY, SUSPENSION (ML) NASAL 2 TIMES DAILY
Qty: 16 G | Refills: 3 | Status: SHIPPED | OUTPATIENT
Start: 2024-06-20

## 2024-06-20 NOTE — PROGRESS NOTES
Mark Kimble is a 43 year old male.    Chief Complaint   Patient presents with    Ear Problem     Left ear fullness       HISTORY OF PRESENT ILLNESS  11/14 I last saw him in September and at that time he had bilateral middle ear effusions. He's been on antibiotics and multiple allergy medications with resolution of his symptoms on the right. He continues to feel a fullness on the left. No other signs, symptoms or complaints  4/26/16 I last saw him for DAMEON of the left ear. Treated conservatively at that time with meds with resolution of his symptoms. He has done reasonably well since then but in the past few weeks has noted progressive fullness of his left ear.  He is a diabetic with reasonably good control of his blood sugars.  He does note some recent nasal congestion but no URI or sinusitis. He notes decreased hearing and fullness AS. No TMJ issues or history of grinding his teeth.    6/20/24 history of chronic ear problems.  States that he went back when Dr. JANE was working here he would have tubes put in occasionally.  I have seen him in the past for middle ear effusions on the left.  Now states that he is having the same problem once again for the past 5 days.  Chronic eustachian tube dysfunction here for further evaluation and management.  Has not had tubes in his time.           Social History     Socioeconomic History    Marital status:    Tobacco Use    Smoking status: Former     Passive exposure: Past    Smokeless tobacco: Never   Vaping Use    Vaping status: Never Used   Substance and Sexual Activity    Alcohol use: Yes     Alcohol/week: 0.0 standard drinks of alcohol     Comment: YES, per [employer] beer and liquor occ    Drug use: No   Other Topics Concern    Caffeine Concern Yes     Comment: coffee       Family History   Problem Relation Age of Onset    Diabetes Father     Lung Disorder Paternal Grandfather     Other (alzeimers) Paternal Grandfather     Glaucoma Neg     Macular degeneration  Neg        Past Medical History:    Diabetes (HCC)    Hyperlipidemia    Tonsillitis    Unspecified sleep apnea       Past Surgical History:   Procedure Laterality Date    Tonsillectomy  2000         REVIEW OF SYSTEMS    System Neg/Pos Details   Constitutional Negative Fatigue, fever and weight loss.   ENMT Negative Drooling.   Eyes Negative Blurred vision and vision changes.   Respiratory Negative Dyspnea and wheezing.   Cardio Negative Chest pain, irregular heartbeat/palpitations and syncope.   GI Negative Abdominal pain and diarrhea.   Endocrine Negative Cold intolerance and heat intolerance.   Neuro Negative Tremors.   Psych Negative Anxiety and depression.   Integumentary Negative Frequent skin infections, pigment change and rash.   Hema/Lymph Negative Easy bleeding and easy bruising.           PHYSICAL EXAM    There were no vitals taken for this visit.       Constitutional Normal Overall appearance - Normal.   Psychiatric Normal Orientation - Oriented to time, place, person & situation. Appropriate mood and affect.   Neck Exam Normal Inspection - Normal. Palpation - Normal. Parotid gland - Normal. Thyroid gland - Normal.   Eyes Normal Conjunctiva - Right: Normal, Left: Normal. Pupil - Right: Normal, Left: Normal. Fundus - Right: Normal, Left: Normal.   Neurological Normal Memory - Normal. Cranial nerves - Cranial nerves II through XII grossly intact.   Head/Face Normal Facial features - Normal. Eyebrows - Normal. Skull - Normal.        Nasopharynx Normal External nose - Normal. Lips/teeth/gums - Normal. Tonsils - Normal. Oropharynx - Normal.   Ears Normal Inspection - Right: Normal, Left: Normal. Canal - Right: Normal, Left: Normal. TM - Right: Normal, Left: Middle ear fluid with retracted tympanic membrane   Skin Normal Inspection - Normal.        Lymph Detail Normal Submental. Submandibular. Anterior cervical. Posterior cervical. Supraclavicular.        Nose/Mouth/Throat Normal External nose - Normal.  Lips/teeth/gums - Normal. Tonsils - Normal. Oropharynx - Normal.   Nose/Mouth/Throat Normal Nares - Right: Normal Left: Normal. Septum -Normal  Turbinates - Right: Normal, Left: Normal.       Current Outpatient Medications:     pseudoephedrine  MG Oral Tablet 12 Hr, Take 1 tablet (120 mg total) by mouth every 12 (twelve) hours., Disp: 60 tablet, Rfl: 3    fluticasone propionate 50 MCG/ACT Nasal Suspension, 1 spray by Nasal route 2 (two) times daily., Disp: 16 g, Rfl: 3    methylPREDNISolone 4 MG Oral Tablet Therapy Pack, Take by oral route., Disp: 21 tablet, Rfl: 0    semaglutide (OZEMPIC, 2 MG/DOSE,) 8 MG/3ML Subcutaneous Solution Pen-injector, Inject 2 mg into the skin once a week., Disp: 3 mL, Rfl: 0    atorvastatin 10 MG Oral Tab, Take 0.5 tablets (5 mg total) by mouth nightly., Disp: 45 tablet, Rfl: 0    Glucose Blood (ONETOUCH ULTRA) In Vitro Strip, To test twice daily, Disp: 200 each, Rfl: 3    Blood Glucose Monitoring Suppl (ONETOUCH ULTRA 2) w/Device Does not apply Kit, Use as directed twice daily, Disp: 1 kit, Rfl: 0    Lancets Does not apply Misc, Use as directed twice daily, Disp: 200 each, Rfl: 3    metFORMIN  MG Oral Tablet 24 Hr, Take 2 tablets (1,000 mg total) by mouth 2 (two) times daily with meals., Disp: 360 tablet, Rfl: 3    mometasone furoate 50 MCG/ACT Nasal Suspension, 1 spray by Nasal route 2 (two) times daily., Disp: 3 each, Rfl: 0    aspirin 81 MG Oral Tab EC, TAKE 1 TABLET BY MOUTH EVERY DAY, Disp: 90 tablet, Rfl: 3    lisinopril 5 MG Oral Tab, Take 1 tablet (5 mg total) by mouth daily., Disp: 90 tablet, Rfl: 3    Glucose Blood (CONTOUR NEXT TEST) In Vitro Strip, Test twice a day, Disp: 200 each, Rfl: 3    meclizine 25 MG Oral Tab, Take 1 tablet (25 mg total) by mouth 3 (three) times daily as needed for Dizziness., Disp: 30 tablet, Rfl: 0    Insulin Pen Needle (BD PEN NEEDLE MINI U/F) 31G X 5 MM Does not apply Misc, USE AS DIRECTED, Disp: 100 each, Rfl: 3  ASSESSMENT AND  PLAN    1. Eustachian tube dysfunction, bilateral  Retracted tympanic membrane on the left.  Middle ear effusion.  We discussed placement of tubes at some in the future.  For now start Sudafed Medrol Dosepak as well as fluticasone.  Return to see me in a few weeks with repeat audiogram.  - pseudoephedrine  MG Oral Tablet 12 Hr; Take 1 tablet (120 mg total) by mouth every 12 (twelve) hours.  Dispense: 60 tablet; Refill: 3        This note was prepared using [employer] voice recognition dictation software. As a result errors may occur. When identified these errors have been corrected. While every attempt is made to correct errors during dictation discrepancies may still exist    Jerson Mayo MD    6/20/2024    8:06 AM

## 2024-07-09 ENCOUNTER — OFFICE VISIT (OUTPATIENT)
Dept: OTOLARYNGOLOGY | Facility: CLINIC | Age: 44
End: 2024-07-09

## 2024-07-09 DIAGNOSIS — H69.93 EUSTACHIAN TUBE DYSFUNCTION, BILATERAL: Primary | ICD-10-CM

## 2024-07-09 PROCEDURE — 99213 OFFICE O/P EST LOW 20 MIN: CPT | Performed by: OTOLARYNGOLOGY

## 2024-07-09 NOTE — PROGRESS NOTES
Mark Kimble is a 43 year old male.    Chief Complaint   Patient presents with    Follow - Up     Patient is here due to eustachian tube dysfunction follow up. Reports improvement in symptoms.  Patient denies hearing test at this time, will come back for another hearing test.        HISTORY OF PRESENT ILLNESS  11/14 I last saw him in September and at that time he had bilateral middle ear effusions. He's been on antibiotics and multiple allergy medications with resolution of his symptoms on the right. He continues to feel a fullness on the left. No other signs, symptoms or complaints  4/26/16 I last saw him for DAMEON of the left ear. Treated conservatively at that time with meds with resolution of his symptoms. He has done reasonably well since then but in the past few weeks has noted progressive fullness of his left ear.  He is a diabetic with reasonably good control of his blood sugars.  He does note some recent nasal congestion but no URI or sinusitis. He notes decreased hearing and fullness AS. No TMJ issues or history of grinding his teeth.     6/20/24 history of chronic ear problems.  States that he went back when Dr. JANE was working here he would have tubes put in occasionally.  I have seen him in the past for middle ear effusions on the left.  Now states that he is having the same problem once again for the past 5 days.  Chronic eustachian tube dysfunction here for further evaluation and management.  Has not had tubes in his time.    7/9/24 last visit he was started on Sudafed Medrol Dosepak as well as fluticasone.  Continues with the spray using the Sudafed on occasion.  Steroids really seem to have helped him with his ear feels that at 70 to 75% better on that left side.  Known history of conductive hearing loss as of October of last year.  Chronic eustachian tube issues left greater than right.  No other signs, symptoms or complaints at this time            Social History     Socioeconomic History     Marital status:    Tobacco Use    Smoking status: Former     Passive exposure: Past    Smokeless tobacco: Never   Vaping Use    Vaping status: Never Used   Substance and Sexual Activity    Alcohol use: Yes     Alcohol/week: 0.0 standard drinks of alcohol     Comment: YES, per [employer] beer and liquor occ    Drug use: No   Other Topics Concern    Caffeine Concern Yes     Comment: coffee       Family History   Problem Relation Age of Onset    Diabetes Father     Lung Disorder Paternal Grandfather     Other (alzeimers) Paternal Grandfather     Glaucoma Neg     Macular degeneration Neg        Past Medical History:    Diabetes (HCC)    Hyperlipidemia    Tonsillitis    Unspecified sleep apnea       Past Surgical History:   Procedure Laterality Date    Tonsillectomy  2000         REVIEW OF SYSTEMS    System Neg/Pos Details   Constitutional Negative Fatigue, fever and weight loss.   ENMT Negative Drooling.   Eyes Negative Blurred vision and vision changes.   Respiratory Negative Dyspnea and wheezing.   Cardio Negative Chest pain, irregular heartbeat/palpitations and syncope.   GI Negative Abdominal pain and diarrhea.   Endocrine Negative Cold intolerance and heat intolerance.   Neuro Negative Tremors.   Psych Negative Anxiety and depression.   Integumentary Negative Frequent skin infections, pigment change and rash.   Hema/Lymph Negative Easy bleeding and easy bruising.           PHYSICAL EXAM    There were no vitals taken for this visit.       Constitutional Normal Overall appearance - Normal.   Psychiatric Normal Orientation - Oriented to time, place, person & situation. Appropriate mood and affect.   Neck Exam Normal Inspection - Normal. Palpation - Normal. Parotid gland - Normal. Thyroid gland - Normal.   Eyes Normal Conjunctiva - Right: Normal, Left: Normal. Pupil - Right: Normal, Left: Normal. Fundus - Right: Normal, Left: Normal.   Neurological Normal Memory - Normal. Cranial nerves - Cranial nerves II through  XII grossly intact.   Head/Face Normal Facial features - Normal. Eyebrows - Normal. Skull - Normal.        Nasopharynx Normal External nose - Normal. Lips/teeth/gums - Normal. Tonsils - Normal. Oropharynx - Normal.   Ears Normal Inspection - Right: Normal, Left: Normal. Canal - Right: Normal, Left: Normal. TM - Right: Normal, Left: Retracted   Skin Normal Inspection - Normal.        Lymph Detail Normal Submental. Submandibular. Anterior cervical. Posterior cervical. Supraclavicular.        Nose/Mouth/Throat Normal External nose - Normal. Lips/teeth/gums - Normal. Tonsils - Normal. Oropharynx - Normal.   Nose/Mouth/Throat Normal Nares - Right: Normal Left: Normal. Septum -deviated to the left turbinates - Right: Normal, Left: Normal.       Current Outpatient Medications:     pseudoephedrine  MG Oral Tablet 12 Hr, Take 1 tablet (120 mg total) by mouth every 12 (twelve) hours., Disp: 60 tablet, Rfl: 3    fluticasone propionate 50 MCG/ACT Nasal Suspension, 1 spray by Nasal route 2 (two) times daily., Disp: 16 g, Rfl: 3    methylPREDNISolone 4 MG Oral Tablet Therapy Pack, Take by oral route., Disp: 21 tablet, Rfl: 0    semaglutide (OZEMPIC, 2 MG/DOSE,) 8 MG/3ML Subcutaneous Solution Pen-injector, Inject 2 mg into the skin once a week., Disp: 3 mL, Rfl: 0    atorvastatin 10 MG Oral Tab, Take 0.5 tablets (5 mg total) by mouth nightly., Disp: 45 tablet, Rfl: 0    Glucose Blood (ONETOUCH ULTRA) In Vitro Strip, To test twice daily, Disp: 200 each, Rfl: 3    Blood Glucose Monitoring Suppl (ONETOUCH ULTRA 2) w/Device Does not apply Kit, Use as directed twice daily, Disp: 1 kit, Rfl: 0    Lancets Does not apply Misc, Use as directed twice daily, Disp: 200 each, Rfl: 3    metFORMIN  MG Oral Tablet 24 Hr, Take 2 tablets (1,000 mg total) by mouth 2 (two) times daily with meals., Disp: 360 tablet, Rfl: 3    mometasone furoate 50 MCG/ACT Nasal Suspension, 1 spray by Nasal route 2 (two) times daily., Disp: 3 each, Rfl: 0     aspirin 81 MG Oral Tab EC, TAKE 1 TABLET BY MOUTH EVERY DAY, Disp: 90 tablet, Rfl: 3    lisinopril 5 MG Oral Tab, Take 1 tablet (5 mg total) by mouth daily., Disp: 90 tablet, Rfl: 3    Glucose Blood (CONTOUR NEXT TEST) In Vitro Strip, Test twice a day, Disp: 200 each, Rfl: 3    meclizine 25 MG Oral Tab, Take 1 tablet (25 mg total) by mouth 3 (three) times daily as needed for Dizziness., Disp: 30 tablet, Rfl: 0    Insulin Pen Needle (BD PEN NEEDLE MINI U/F) 31G X 5 MM Does not apply Misc, USE AS DIRECTED, Disp: 100 each, Rfl: 3  ASSESSMENT AND PLAN    1. Eustachian tube dysfunction, bilateral  70 to 75% better overall.  Feels that his hearing is improved closer to his baseline.  Known history of hearing loss with last study performed in October of last year demonstrating a conductive component on the left side only.  I did recommend repeating a hearing test but he states that he will hold off on that for now and perhaps do it when he returns.  I did recommend continued use of his allergy meds and to wean as tolerated over the summer months and to return to see me at the end of the summer with audiogram.  Tubes but at this time no indication for replacement as he is doing much better and is still very close to his baseline.  In addition discussed that he has a deviated septum but he denies any nasal obstructive issues is doing well with his CPAP and tolerating it without difficulty therefore no indication for septoplasty at this time either.        This note was prepared using [employer] voice recognition dictation software. As a result errors may occur. When identified these errors have been corrected. While every attempt is made to correct errors during dictation discrepancies may still exist    Jerson Mayo MD    7/9/2024    8:16 AM

## 2024-07-17 RX ORDER — SEMAGLUTIDE 2.68 MG/ML
2 INJECTION, SOLUTION SUBCUTANEOUS WEEKLY
Qty: 3 ML | Refills: 0 | OUTPATIENT
Start: 2024-07-17

## 2024-07-17 NOTE — TELEPHONE ENCOUNTER
Please review; protocol failed/No Protocol    Last Office Visit: 09/18/2023  Sent [employer] to patient to schedule an appointment    Sent [employer] message to patient to complete labs from 09/18/2023    Requested Prescriptions   Pending Prescriptions Disp Refills    OZEMPIC, 2 MG/DOSE, 8 MG/3ML Subcutaneous Solution Pen-injector [Pharmacy Med Name: OZEMPIC 8 MG/3 ML (2 MG/DOSE)]  0     Sig: Inject 2 mg into the skin once a week.       Diabetes Medication Protocol Failed - 7/14/2024 12:13 AM        Failed - Last A1C < 7.5 and within past 6 months     Lab Results   Component Value Date    A1C 7.1 (A) 06/28/2023             Failed - In person appointment or virtual visit in the past 6 mos or appointment in next 3 mos     Recent Outpatient Visits              1 week ago Eustachian tube dysfunction, bilateral    [facility], Jerson Alejandre MD    Office Visit    3 weeks ago Eustachian tube dysfunction, bilateral    [facility], Jerson Alejandre MD    Office Visit    8 months ago Recurrent acute serous otitis media of both ears    [facility] Juan Francisco Davis MD    Office Visit    9 months ago Mixed hearing loss, bilateral    [facility], [place] [facility]    Office Visit    9 months ago Tinnitus of both ears    [facility]    Office Visit                      Passed - Microalbumin procedure in past 12 months or taking ACE/ARB        Passed - EGFRCR or GFRNAA > 50     GFR Evaluation  EGFRCR: 103 , resulted on 9/20/2023          Passed - GFR in the past 12 months             Recent Outpatient Visits              1 week ago Eustachian tube dysfunction, bilateral    [facility], Jerson Alejandre MD    Office Visit    3 weeks ago Eustachian tube dysfunction,  bilateral    [place], [place]erson Mishra MD    Office Visit    8 months ago Recurrent acute serous otitis media of both ears    [facility]    Office Visit    9 months ago Mixed hearing loss, bilateral    [facility] [place] [facility]    Office Visit    9 months ago Tinnitus of both ears    [facility]    [facility] Visit

## 2024-07-18 RX ORDER — SEMAGLUTIDE 2.68 MG/ML
2 INJECTION, SOLUTION SUBCUTANEOUS WEEKLY
Qty: 9 ML | Refills: 0 | Status: SHIPPED | OUTPATIENT
Start: 2024-07-18

## 2024-07-23 RX ORDER — SEMAGLUTIDE 2.68 MG/ML
2 INJECTION, SOLUTION SUBCUTANEOUS WEEKLY
Qty: 3 ML | Refills: 0 | OUTPATIENT
Start: 2024-07-23

## 2024-08-20 RX ORDER — METFORMIN HCL 500 MG
1000 TABLET, EXTENDED RELEASE 24 HR ORAL 2 TIMES DAILY WITH MEALS
Qty: 360 TABLET | Refills: 3 | OUTPATIENT
Start: 2024-08-20

## 2024-09-14 DIAGNOSIS — E78.5 HYPERLIPIDEMIA, UNSPECIFIED HYPERLIPIDEMIA TYPE: ICD-10-CM

## 2024-09-18 NOTE — TELEPHONE ENCOUNTER
Please review.  Protocol failed / Has no protocol.     [employer] message sent to patient to schedule an office visit with Primary Care Provider.   Will also route to Call Center to make a phone attempt.    Requested Prescriptions   Pending Prescriptions Disp Refills    ATORVASTATIN 10 MG Oral Tab [Pharmacy Med Name: ATORVASTATIN 10 MG TABLET] 15 tablet 0     Sig: TAKE 0.5 TABLETS BY MOUTH NIGHTLY.  **Appointment needed for further refills.       Cholesterol Medication Protocol Failed - 9/14/2024 12:33 AM        Failed - ALT < 80     Lab Results   Component Value Date    ALT 31 07/19/2023             Failed - ALT resulted within past year        Failed - Lipid panel within past 12 months     Lab Results   Component Value Date    CHOLEST 136 06/14/2023    TRIG 263 (A) 06/14/2023    HDL 34 (A) 06/14/2023    LDL 49 06/14/2023    VLDL 53 (H) 09/09/2021    NONHDLC 146 (H) 09/09/2021             Passed - In person appointment or virtual visit in the past 12 mos or appointment in next 3 mos     Recent Outpatient Visits              2 months ago Eustachian tube dysfunction, bilateral    [facility] Jerson Mayo MD    Office Visit    3 months ago [facility]    [facility] Visit    10 months ago Recurrent acute serous otitis media of both ears    [facility] Juan Francisco Davis MD    Office Visit    11 months ago Mixed hearing loss, bilateral    [facility] Danitza Fuentes AUD    Office Visit    11 months ago Tinnitus of both ears    [facility] Juan Francisco Davis MD    Office Visit          Future Appointments         Provider Department Appt Notes    In 1 week Chelo Suazo MD [facility]  consult                       Future Appointments         Provider Department Appt Notes    In 1 week Chelo Suazo MD [facility] Colonoscopy consult          Recent Outpatient Visits              2 months ago Eustachian tube dysfunction, bilateral    [facility] [place]    Office Visit    3 months ago Eustachian tube dysfunction, bilateral    [facility] [place]    Office Visit    10 months ago Recurrent acute serous otitis media of both ears    [facility] Juan Francisco Davis MD    Office Visit    11 months ago Mixed hearing loss, bilateral    [facility] [place] Danitza Fuentes AUD    Office Visit    11 months ago [facility]innitus of both ears    [facility]    Office Visit

## 2024-09-19 RX ORDER — ATORVASTATIN CALCIUM 10 MG/1
5 TABLET, FILM COATED ORAL NIGHTLY
Qty: 15 TABLET | Refills: 0 | Status: SHIPPED | OUTPATIENT
Start: 2024-09-19

## 2024-09-30 ENCOUNTER — OFFICE VISIT (OUTPATIENT)
Facility: CLINIC | Age: 44
End: 2024-09-30
Payer: COMMERCIAL

## 2024-09-30 ENCOUNTER — TELEPHONE (OUTPATIENT)
Facility: CLINIC | Age: 44
End: 2024-09-30

## 2024-09-30 VITALS
BODY MASS INDEX: 35.39 KG/M2 | HEART RATE: 85 BPM | SYSTOLIC BLOOD PRESSURE: 115 MMHG | WEIGHT: 267 LBS | HEIGHT: 73 IN | DIASTOLIC BLOOD PRESSURE: 73 MMHG

## 2024-09-30 DIAGNOSIS — Z12.11 COLON CANCER SCREENING: Primary | ICD-10-CM

## 2024-09-30 DIAGNOSIS — Z12.11 SPECIAL SCREENING FOR MALIGNANT NEOPLASMS, COLON: Primary | ICD-10-CM

## 2024-09-30 DIAGNOSIS — E11.9 DIABETES MELLITUS WITHOUT COMPLICATION (HCC): Primary | ICD-10-CM

## 2024-09-30 RX ORDER — SODIUM, POTASSIUM,MAG SULFATES 17.5-3.13G
SOLUTION, RECONSTITUTED, ORAL ORAL
Qty: 1 EACH | Refills: 0 | Status: SHIPPED | OUTPATIENT
Start: 2024-09-30

## 2024-09-30 NOTE — TELEPHONE ENCOUNTER
Patient was seen in office today with Dr. Suazo  and was given orders to schedule. Please call patient to schedule his/her procedure with the orders given below. Patient was given the phone number and prep instructions at the time of the office visit. The prep instructions was also explained to the patient at the time of the visit. The patient verbalized understanding the prep and that a GI  will call him/her for the procedure.  If patient calls before the 1 week turnaround please schedule with the orders given below, thank you!    Orders from Dr. Suazo     1. Schedule colonoscopy with MAC [Diagnosis: CRC screening].     2.  bowel prep from pharmacy (split dose suprep). If your prescription is not available and/or is cost-prohibitive, please contact the office as soon as possible to ensure you receive a bowel prep before your procedure. Look on [employer] or 1-800-[employer] website for coupons.      3. Medication adjustments:        A. SEVEN DAYS BEFORE colonoscopy: HOLD ozempic     B. DAY BEFORE colonoscopy: HOLD metformin      C. Continue ALL other medications as usual     4. Read all bowel prep instructions carefully.     5. AVOID seeds, nuts, popcorn, and raw fruits and vegetables (cooked is okay) for 2-3 days before procedure.     6. If you start any NEW medication after your visit today, please notify us. Certain medications will need to be held before the procedure or the procedure cannot be performed.      7. You will need a ride home from your procedure since you are receiving sedation. Please ensure you will have an available ride home or the procedure cannot be performed.

## 2024-10-01 NOTE — TELEPHONE ENCOUNTER
Scheduled for:  Colonoscopy 67[address]    Provider Name:  Dr Suazo    Date:  2/5/2025    Location:    [facility]    Sedation:  MAC    Time:  9:45 am (Patient made aware EM will call the day before with an arrival time)    Prep:  Trilyte    Meds/Allergies Reconciled?:  Physician reviewed     Diagnosis with codes:    Special screening for malignant neoplasms, colon [Z12.11]     Was patient informed to call insurance with codes (Y/N):  Yes, I confirmed [employer] insurance with the patient.     Referral sent?:  N/A    [facility] or [facility] notified?:  I sent an electronic request to Endo Scheduling and received a confirmation today.      Medication Orders:  This patient verbally confirmed that she is not taking:   Iron, blood thinners, BP meds, and is not diabetic   Not latex allergy, Not PCN allergy and does not have a pacemaker     Misc Orders:    Hold Ozempic 7 days prior to procedure  Hold Metformin the day prior to the procedure and the day of the procedure     Further instructions given by staff:   I discussed the prep instructions with the patient which he verbally understood and is aware that I will send the instructions today via [employer].    Advised patient:    You will not be able to drive, operate machinery or make critical decisions the day of your procedure. Please make arrangements for transportation. You must have a  (age 18 or older) to accompany you, stay in the facility for the duration of your procedure and drive you home after the procedure.  You cannot use public transportation (Uber, Lyft, Taxi). The procedure involves sedation, and you will not be allowed to leave unaccompanied. Your procedure will not proceed forward if you're unable to confirm your  planned to escort you home.

## 2024-10-03 ENCOUNTER — TELEPHONE (OUTPATIENT)
Dept: INTERNAL MEDICINE CLINIC | Facility: CLINIC | Age: 44
End: 2024-10-03

## 2024-10-03 DIAGNOSIS — E78.5 HYPERLIPIDEMIA, UNSPECIFIED HYPERLIPIDEMIA TYPE: ICD-10-CM

## 2024-10-03 NOTE — TELEPHONE ENCOUNTER
Pt is requesting refill for the following medication        atorvastatin 10 MG Oral Tab, Take 0.5 tablets (5 mg total) by mouth nightly. **Appointment needed for further refills., Disp: 15 tablet, Rfl: 0

## 2024-10-06 NOTE — TELEPHONE ENCOUNTER
Please review; protocol failed/ has no protocol      No active /future labs noted   Message sent for patient to make an appointment.     Requested Prescriptions   Pending Prescriptions Disp Refills    atorvastatin 10 MG Oral Tab 15 tablet 0     Sig: Take 0.5 tablets (5 mg total) by mouth nightly. **Appointment needed for further refills.       Cholesterol Medication Protocol Failed - 10/3/2024  4:03 PM        Failed - ALT < 80     Lab Results   Component Value Date    ALT 31 07/19/2023             Failed - ALT resulted within past year        Failed - Lipid panel within past 12 months     Lab Results   Component Value Date    CHOLEST 136 06/14/2023    TRIG 263 (A) 06/14/2023    HDL 34 (A) 06/14/2023    LDL 49 06/14/2023    VLDL 53 (H) 09/09/2021    NONHDLC 146 (H) 09/09/2021             Failed - In person appointment or virtual visit in the past 12 mos or appointment in next 3 mos     Recent Outpatient Visits              6 days ago Colon cancer screening    [facility] Chelo Suazo MD    Office Visit    2 months ago Eustachian tube dysfunction, bilateral    [facility] [place]    Office Visit    3 months ago [facility], [facility]    Office Visit    10 months ago Recurrent acute serous otitis media of both ears    [facility]    Office Visit    1 year ago Mixed hearing loss, bilateral    [facility] [place] [facility]    Office Visit          Future Appointments         Provider Department Appt Notes    In 4 months KRISSY, PAPA [facility] [place] COLON MAC @ [facility]                       Recent Outpatient Visits              6 days ago Colon cancer screening     [facility] Chelo Suazo MD    Office Visit    2 months ago Eustachian tube dysfunction, bilateral    [facility] [place]    Office Visit    3 months ago Eustachian tube dysfunction, bilateral    [facility] [place] [facility]    Office Visit    10 months ago Recurrent acute serous otitis media of both ears    [facility]    Office Visit    1 year ago Mixed hearing loss, bilateral    [facility]    Office Visit          Future Appointments         Provider Department Appt Notes    In 4 months KR[facility] PROCEDURE [facility]

## 2024-10-07 RX ORDER — ATORVASTATIN CALCIUM 10 MG/1
5 TABLET, FILM COATED ORAL NIGHTLY
Qty: 90 TABLET | Refills: 3 | Status: SHIPPED | OUTPATIENT
Start: 2024-10-07

## 2024-10-07 NOTE — TELEPHONE ENCOUNTER
Talked to patient told him message below understood and either he will call back or he will make the appointment thru his MyChart.

## 2024-12-05 NOTE — TELEPHONE ENCOUNTER
Please review. Protocol Failed; No Protocol    Future Appointments   Date Time Provider Department Center   1/6/2025  9:20 AM Pippa Tejada MD [facility]   1/29/2025  8:45 AM Leo Hernández APRN [facility]   2/5/2025  9:45 AM PAPA REY [id number] None         Requested Prescriptions   Pending Prescriptions Disp Refills    ASPIRIN 81 MG Oral Tab EC [Pharmacy Med Name: [facility] ASPIRIN EC 81 MG TABLET] 90 tablet 3     Sig: TAKE 1 TABLET BY MOUTH EVERY DAY       Aspirin Protocol Passed - 12/5/2024  2:14 PM        Passed - In person appointment or virtual visit in the past 6 mos or appointment in next 3 mos     Recent Outpatient Visits              2 months ago Colon cancer screening    [facility] Chelo Rey MD    Office Visit    4 months ago Eustachian tube dysfunction, bilateral    [facility]    Office Visit    5 months ago Eustachian tube dysfunction, bilateral    [facility]    Office Visit    1 year ago Recurrent acute serous otitis media of both ears    [facility] Juan Francisco Davis MD    Office Visit    1 year ago Mixed hearing loss, bilateral    [facility] Danitza Fuentes AUD    Office Visit          Future Appointments         Provider Department Appt Notes    In 1 month [facility]  last px 9/18/23    In 1 month Leo Hernández APRN [facility] Diabetes f/u    In 2 months PAPA REY [facility] COLON MAC @ [facility]                      LISINOPRIL 5 MG Oral Tab [Pharmacy Med Name: LISINOPRIL 5 MG TABLET] 90 tablet 3     Sig: Take 1 tablet (5 mg total) by mouth  daily.       Hypertension Medications Protocol Failed - 12/5/2024  2:14 PM        Failed - CMP or BMP in past 12 months        Failed - EGFRCR or GFRNAA > 50     GFR Evaluation            Passed - Last BP reading less than 140/90     BP Readings from Last 1 Encounters:   09/30/24 115/73               Passed - In person appointment or virtual visit in the past 12 mos or appointment in next 3 mos     Recent Outpatient Visits              2 months ago Colon cancer screening    [facility] Chelo Suazo MD    Office Visit    4 months ago Eustachian tube dysfunction, bilateral    [facility] [place]    Office Visit    5 months ago Eustachian tube dysfunction, bilateral    [facility] [place]    Office Visit    1 year ago Recurrent acute serous otitis media of both ears    [facility] Juan Francisco Davis MD    Office Visit    1 year ago Mixed hearing loss, bilateral    [facility] [place] [facility]    Office Visit          Future Appointments         Provider Department Appt Notes    In 1 month Pippa Tejada MD [facility] Physical  last px 9/18/23    In 1 month Leo Hernández APRN [facility] [place] Diabetes f/u    In 2 months [facility]                           Future Appointments         Provider Department Appt Notes    In 1 month Pippa Tejada MD [facility] [place] Physical  last px 9/18/23    In 1 month Leo Hernández APRN [facility] [place] Diabetes f/u    In 2 months KRISSY, PROCEDURE [facility]  COLON MAC @ [facility]          Recent Outpatient Visits              2 months ago [facility] cancer screening    [facility]    Office Visit    4 months ago Eustachian tube dysfunction, bilateral    [facility] Jerson Alejandre MD    Office Visit    5 months ago Eustachian tube dysfunction, bilateral    [facility] [place]    Office Visit    1 year ago Recurrent acute serous otitis media of both ears    [facility]    Office Visit    1 year ago Mixed hearing loss, bilateral    [facility] [place] [facility]    Office Visit

## 2024-12-06 RX ORDER — LISINOPRIL 5 MG/1
5 TABLET ORAL DAILY
Qty: 90 TABLET | Refills: 3 | Status: SHIPPED | OUTPATIENT
Start: 2024-12-06

## 2024-12-06 RX ORDER — ASPIRIN 81 MG/1
TABLET ORAL
Qty: 90 TABLET | Refills: 3 | Status: SHIPPED | OUTPATIENT
Start: 2024-12-06

## 2025-01-03 NOTE — TELEPHONE ENCOUNTER
----- Message from Varsha HUSTON sent at 1/2/2025  3:48 PM EST -----  Regarding: RE: Abnormal ZIO results  Patient was in atrial fibrillation the entire time he wore the monitor.  This indicates that the flecainide is not working.  Can you please call him and if he feels okay (which she did in the office, he did not even know he was in atrial fibrillation) can you have him stop his flecainide and come in for an EKG in 1 week after stopping this.    If he does not feel overall good we should set up a cardioversion soon, please let me know if he had symptoms and I will also make adjustments to his medications.  I had a feeling when he was in the office with me that he had been in atrial fibrillation for some time because he was unaware of when it even started.  So likely we will stop the flecainide.  Thanks, Varsha  ----- Message -----  From: Gail Mcnamara MA  Sent: 1/2/2025   2:24 PM EST  To: May Rodriguez MD; #  Subject: Abnormal ZIO results                             Episode of slow AF with a heart rate of 39 bpm for 60 seconds on 12/13/2024 @ 10:56 PM. Assigned in zio suite.     Electronically signed by Gail Mcnamara MA on 1/2/2025 at 2:24 PM   Hypertensive Medications  Protocol Criteria:  · Appointment scheduled in the past 6 months or in the next 3 months  · BMP or CMP in the past 12 months  · Creatinine result < 2  Recent Outpatient Visits            6 months ago Chronic low back pain without

## 2025-01-09 ENCOUNTER — OFFICE VISIT (OUTPATIENT)
Dept: INTERNAL MEDICINE CLINIC | Facility: CLINIC | Age: 45
End: 2025-01-09

## 2025-01-09 ENCOUNTER — LAB ENCOUNTER (OUTPATIENT)
Dept: LAB | Age: 45
End: 2025-01-09
Attending: INTERNAL MEDICINE
Payer: COMMERCIAL

## 2025-01-09 VITALS
HEIGHT: 73 IN | SYSTOLIC BLOOD PRESSURE: 113 MMHG | BODY MASS INDEX: 34.06 KG/M2 | DIASTOLIC BLOOD PRESSURE: 73 MMHG | WEIGHT: 257 LBS | HEART RATE: 76 BPM

## 2025-01-09 DIAGNOSIS — Z00.00 PHYSICAL EXAM: ICD-10-CM

## 2025-01-09 DIAGNOSIS — E11.9 DIABETES MELLITUS WITHOUT COMPLICATION (HCC): ICD-10-CM

## 2025-01-09 DIAGNOSIS — E11.9 DIABETES MELLITUS TYPE 2 WITHOUT RETINOPATHY (HCC): ICD-10-CM

## 2025-01-09 DIAGNOSIS — Z00.00 PHYSICAL EXAM: Primary | ICD-10-CM

## 2025-01-09 LAB
ALBUMIN SERPL-MCNC: 4.6 G/DL (ref 3.2–4.8)
ALBUMIN/GLOB SERPL: 1.8 {RATIO} (ref 1–2)
ALP LIVER SERPL-CCNC: 66 U/L
ALT SERPL-CCNC: 28 U/L
ANION GAP SERPL CALC-SCNC: 7 MMOL/L (ref 0–18)
AST SERPL-CCNC: 16 U/L (ref ?–34)
BASOPHILS # BLD AUTO: 0.07 X10(3) UL (ref 0–0.2)
BASOPHILS NFR BLD AUTO: 1 %
BILIRUB SERPL-MCNC: 0.5 MG/DL (ref 0.3–1.2)
BILIRUB UR QL: NEGATIVE
BUN BLD-MCNC: 14 MG/DL (ref 9–23)
BUN/CREAT SERPL: 15.4 (ref 10–20)
CALCIUM BLD-MCNC: 9.7 MG/DL (ref 8.7–10.4)
CHLORIDE SERPL-SCNC: 103 MMOL/L (ref 98–112)
CHOLEST SERPL-MCNC: 151 MG/DL (ref ?–200)
CLARITY UR: CLEAR
CO2 SERPL-SCNC: 27 MMOL/L (ref 21–32)
CREAT BLD-MCNC: 0.91 MG/DL
CREAT UR-SCNC: 164.4 MG/DL
DEPRECATED RDW RBC AUTO: 38.7 FL (ref 35.1–46.3)
EGFRCR SERPLBLD CKD-EPI 2021: 106 ML/MIN/1.73M2 (ref 60–?)
EOSINOPHIL # BLD AUTO: 0.18 X10(3) UL (ref 0–0.7)
EOSINOPHIL NFR BLD AUTO: 2.5 %
ERYTHROCYTE [DISTWIDTH] IN BLOOD BY AUTOMATED COUNT: 12.1 % (ref 11–15)
EST. AVERAGE GLUCOSE BLD GHB EST-MCNC: 143 MG/DL (ref 68–126)
FASTING PATIENT LIPID ANSWER: YES
FASTING STATUS PATIENT QL REPORTED: YES
GLOBULIN PLAS-MCNC: 2.6 G/DL (ref 2–3.5)
GLUCOSE BLD-MCNC: 105 MG/DL (ref 70–99)
GLUCOSE UR-MCNC: NORMAL MG/DL
HBA1C MFR BLD: 6.6 % (ref ?–5.7)
HCT VFR BLD AUTO: 47.2 %
HDLC SERPL-MCNC: 43 MG/DL (ref 40–59)
HGB BLD-MCNC: 16.3 G/DL
HGB UR QL STRIP.AUTO: NEGATIVE
IMM GRANULOCYTES # BLD AUTO: 0.03 X10(3) UL (ref 0–1)
IMM GRANULOCYTES NFR BLD: 0.4 %
KETONES UR-MCNC: NEGATIVE MG/DL
LDLC SERPL CALC-MCNC: 81 MG/DL (ref ?–100)
LEUKOCYTE ESTERASE UR QL STRIP.AUTO: NEGATIVE
LYMPHOCYTES # BLD AUTO: 1.99 X10(3) UL (ref 1–4)
LYMPHOCYTES NFR BLD AUTO: 28.1 %
MCH RBC QN AUTO: 30.3 PG (ref 26–34)
MCHC RBC AUTO-ENTMCNC: 34.5 G/DL (ref 31–37)
MCV RBC AUTO: 87.7 FL
MICROALBUMIN UR-MCNC: <0.3 MG/DL
MONOCYTES # BLD AUTO: 0.61 X10(3) UL (ref 0.1–1)
MONOCYTES NFR BLD AUTO: 8.6 %
NEUTROPHILS # BLD AUTO: 4.19 X10 (3) UL (ref 1.5–7.7)
NEUTROPHILS # BLD AUTO: 4.19 X10(3) UL (ref 1.5–7.7)
NEUTROPHILS NFR BLD AUTO: 59.4 %
NITRITE UR QL STRIP.AUTO: NEGATIVE
NONHDLC SERPL-MCNC: 108 MG/DL (ref ?–130)
OSMOLALITY SERPL CALC.SUM OF ELEC: 285 MOSM/KG (ref 275–295)
PH UR: 5.5 [PH] (ref 5–8)
PLATELET # BLD AUTO: 227 10(3)UL (ref 150–450)
POTASSIUM SERPL-SCNC: 4.4 MMOL/L (ref 3.5–5.1)
PROT SERPL-MCNC: 7.2 G/DL (ref 5.7–8.2)
PROT UR-MCNC: NEGATIVE MG/DL
RBC # BLD AUTO: 5.38 X10(6)UL
SODIUM SERPL-SCNC: 137 MMOL/L (ref 136–145)
SP GR UR STRIP: 1.02 (ref 1–1.03)
T4 FREE SERPL-MCNC: 1.4 NG/DL (ref 0.8–1.7)
TRIGL SERPL-MCNC: 153 MG/DL (ref 30–149)
TSI SER-ACNC: 1.86 UIU/ML (ref 0.55–4.78)
UROBILINOGEN UR STRIP-ACNC: NORMAL
VLDLC SERPL CALC-MCNC: 24 MG/DL (ref 0–30)
WBC # BLD AUTO: 7.1 X10(3) UL (ref 4–11)

## 2025-01-09 PROCEDURE — 83036 HEMOGLOBIN GLYCOSYLATED A1C: CPT | Performed by: INTERNAL MEDICINE

## 2025-01-09 PROCEDURE — 82043 UR ALBUMIN QUANTITATIVE: CPT

## 2025-01-09 PROCEDURE — 36415 COLL VENOUS BLD VENIPUNCTURE: CPT

## 2025-01-09 PROCEDURE — 80053 COMPREHEN METABOLIC PANEL: CPT

## 2025-01-09 PROCEDURE — 81003 URINALYSIS AUTO W/O SCOPE: CPT

## 2025-01-09 PROCEDURE — 80061 LIPID PANEL: CPT

## 2025-01-09 PROCEDURE — 99396 PREV VISIT EST AGE 40-64: CPT | Performed by: INTERNAL MEDICINE

## 2025-01-09 PROCEDURE — 82570 ASSAY OF URINE CREATININE: CPT

## 2025-01-09 PROCEDURE — 84443 ASSAY THYROID STIM HORMONE: CPT

## 2025-01-09 PROCEDURE — 84439 ASSAY OF FREE THYROXINE: CPT

## 2025-01-09 PROCEDURE — 85025 COMPLETE CBC W/AUTO DIFF WBC: CPT

## 2025-01-21 NOTE — PROGRESS NOTES
HPI:    Patient ID: Mark Kimble is a 44 year old male.    HPI    Physical exam  Generally healthy    Losing wt   Colonoscopy scheduled 2/5/25  /73 (BP Location: Right arm, Patient Position: Sitting, Cuff Size: large)   Pulse 76   Ht 6' 1\" (1.854 m)   Wt 257 lb (116.6 kg)   BMI 33.91 kg/m²   Wt Readings from Last 6 Encounters:   01/09/25 257 lb (116.6 kg)   09/30/24 267 lb (121.1 kg)   11/13/23 270 lb (122.5 kg)   10/02/23 270 lb (122.5 kg)   09/25/23 270 lb (122.5 kg)   09/18/23 275 lb (124.7 kg)     Body mass index is 33.91 kg/m².  HGBA1C:    Lab Results   Component Value Date    A1C 6.6 (H) 01/09/2025    A1C 7.1 (A) 06/28/2023    A1C 9.0 (A) 03/13/2023     (H) 01/09/2025         Review of Systems   Constitutional: Negative.  Negative for appetite change, chills, fatigue and fever.   HENT:  Negative for congestion, ear discharge, ear pain, rhinorrhea, sinus pressure, sneezing, sore throat, trouble swallowing and voice change.    Eyes:  Negative for pain and discharge.   Respiratory:  Negative for cough, chest tightness, shortness of breath and wheezing.    Cardiovascular:  Negative for chest pain, palpitations and leg swelling.   Gastrointestinal:  Negative for abdominal distention, abdominal pain, blood in stool, constipation, diarrhea, nausea and vomiting.   Endocrine: Negative for cold intolerance and heat intolerance.   Genitourinary:  Negative for decreased urine volume, difficulty urinating, dysuria, frequency, hematuria and urgency.   Musculoskeletal:  Negative for arthralgias, back pain, gait problem and joint swelling.   Skin:  Negative for rash.   Allergic/Immunologic: Negative for environmental allergies and food allergies.   Neurological:  Negative for dizziness, tremors, seizures, weakness, light-headedness and headaches.   Hematological:  Negative for adenopathy. Does not bruise/bleed easily.   Psychiatric/Behavioral:  Negative for behavioral problems and confusion.           Current Outpatient Medications   Medication Sig Dispense Refill    aspirin 81 MG Oral Tab EC TAKE 1 TABLET BY MOUTH EVERY DAY 90 tablet 3    lisinopril 5 MG Oral Tab Take 1 tablet (5 mg total) by mouth daily. 90 tablet 3    atorvastatin 10 MG Oral Tab Take 0.5 tablets (5 mg total) by mouth nightly. **Appointment needed for further refills. 90 tablet 3    semaglutide (OZEMPIC, 2 MG/DOSE,) 8 MG/3ML Subcutaneous Solution Pen-injector Inject 2 mg into the skin once a week. 9 mL 0    Glucose Blood (ONETOUCH ULTRA) In Vitro Strip To test twice daily 200 each 3    Blood Glucose Monitoring Suppl (ONETOUCH ULTRA 2) w/Device Does not apply Kit Use as directed twice daily 1 kit 0    Lancets Does not apply Misc Use as directed twice daily 200 each 3    metFORMIN  MG Oral Tablet 24 Hr Take 2 tablets (1,000 mg total) by mouth 2 (two) times daily with meals. 360 tablet 3    Insulin Pen Needle (BD PEN NEEDLE MINI U/F) 31G X 5 MM Does not apply Misc USE AS DIRECTED 100 each 3    methylPREDNISolone 4 MG Oral Tablet Therapy Pack Take by oral route. (Patient not taking: Reported on 1/9/2025) 21 tablet 0     Allergies:Allergies[1]    HISTORY:  Past Medical History:    Diabetes (HCC)    Diabetes mellitus (HCC)    Hyperlipidemia    Tonsillitis    Unspecified sleep apnea      Past Surgical History:   Procedure Laterality Date    Tonsillectomy  2000      Family History   Problem Relation Age of Onset    Diabetes Father     Lung Disorder Paternal Grandfather     Other (alzeimers) Paternal Grandfather     Glaucoma Neg     Macular degeneration Neg       Social History:   Social History     Socioeconomic History    Marital status:    Tobacco Use    Smoking status: Former     Passive exposure: Past    Smokeless tobacco: Never    Tobacco comments:     Quit 20 years ago   Vaping Use    Vaping status: Never Used   Substance and Sexual Activity    Alcohol use: Yes     Alcohol/week: 0.0 standard drinks of alcohol     Comment: YES, per  [employer] beer and liquor occ    Drug use: No   Other Topics Concern    Caffeine Concern Yes     Comment: coffee        PHYSICAL EXAM:    Physical Exam  Constitutional:       Appearance: He is not ill-appearing.   HENT:      Right Ear: Ear canal normal.      Left Ear: Ear canal normal.      Mouth/Throat:      Pharynx: Oropharynx is clear.   Eyes:      Extraocular Movements: Extraocular movements intact.      Conjunctiva/sclera: Conjunctivae normal.      Pupils: Pupils are equal, round, and reactive to light.   Neck:      Vascular: No carotid bruit.   Cardiovascular:      Rate and Rhythm: Normal rate and regular rhythm.   Pulmonary:      Effort: No respiratory distress.      Breath sounds: Normal breath sounds. No wheezing.   Abdominal:      General: Bowel sounds are normal.      Palpations: Abdomen is soft.   Musculoskeletal:      Right lower leg: No edema.      Left lower leg: No edema.   Lymphadenopathy:      Cervical: No cervical adenopathy.   Skin:     General: Skin is warm and dry.   Neurological:      Mental Status: He is alert.   Psychiatric:         Mood and Affect: Mood normal.              ASSESSMENT/PLAN:   (Z00.00) Physical exam  (primary encounter diagnosis)  Plan: CBC With Differential With Platelet, Comp         Metabolic Panel (14), Lipid Panel, TSH and Free        T4, Urinalysis, Routine, CANCELED: Hemoglobin         A1C        Generally healthy    (E11.9) Diabetes mellitus type 2 without retinopathy (HCC)  Plan: Microalb/Creat Ratio, Random Urine,         OPHTHALMOLOGY - INTERNAL, ALT+AST, Hemoglobin         A1C, Lipid Panel, CANCELED: Basic Metabolic         Panel (8)        Losing wt   under control  Eye exam yearly adivsed    Patient voiced understanding  and agrees with plan         Orders Placed This Encounter   Procedures    CBC With Differential With Platelet    Comp Metabolic Panel (14)    Lipid Panel    TSH and Free T4    Urinalysis, Routine    Microalb/Creat Ratio, Random Urine    ALT+AST     Hemoglobin A1C    Lipid Panel       Meds This Visit:  Requested Prescriptions      No prescriptions requested or ordered in this encounter       Imaging & Referrals:  OPHTHALMOLOGY - INTERNAL        ID#[id number]           [1] No Known Allergies

## 2025-01-22 ENCOUNTER — OFFICE VISIT (OUTPATIENT)
Dept: ENDOCRINOLOGY CLINIC | Facility: CLINIC | Age: 45
End: 2025-01-22
Payer: COMMERCIAL

## 2025-01-22 VITALS
WEIGHT: 257 LBS | DIASTOLIC BLOOD PRESSURE: 81 MMHG | HEIGHT: 73 IN | SYSTOLIC BLOOD PRESSURE: 120 MMHG | HEART RATE: 76 BPM | BODY MASS INDEX: 34.06 KG/M2

## 2025-01-22 DIAGNOSIS — E11.9 TYPE 2 DIABETES MELLITUS WITHOUT COMPLICATION, WITHOUT LONG-TERM CURRENT USE OF INSULIN (HCC): Primary | ICD-10-CM

## 2025-01-22 LAB
GLUCOSE BLOOD: 149
TEST STRIP LOT #: NORMAL NUMERIC

## 2025-01-22 PROCEDURE — 82947 ASSAY GLUCOSE BLOOD QUANT: CPT | Performed by: NURSE PRACTITIONER

## 2025-01-22 PROCEDURE — 99213 OFFICE O/P EST LOW 20 MIN: CPT | Performed by: NURSE PRACTITIONER

## 2025-01-22 RX ORDER — TIRZEPATIDE 10 MG/.5ML
10 INJECTION, SOLUTION SUBCUTANEOUS WEEKLY
Qty: 6 ML | Refills: 0 | Status: SHIPPED | OUTPATIENT
Start: 2025-01-22

## 2025-01-22 NOTE — PATIENT INSTRUCTIONS
A1C: 6.6% on 1/9/2024 --> previously was 7.1% on 6/28/2023  Blood glucose: 149 in clinic today    Medications:   - continue with Metformin 1,000mg twice daily    --> when you start mounjaro decrease Metformin to 500mg (1 tablet) twice daily  - continue with Ozempic 2mg once weekly until home supply is finished    --> then transition to Mounjaro 10mg once weekly      - continue to follow a low carb diet  - continue to stay active as currently doing     Weight:  Wt Readings from Last 6 Encounters:   01/22/25 257 lb (116.6 kg)   01/09/25 257 lb (116.6 kg)   09/30/24 267 lb (121.1 kg)   11/13/23 270 lb (122.5 kg)   10/02/23 270 lb (122.5 kg)   09/25/23 270 lb (122.5 kg)     A1C goal:  <7.0%    Blood sugar testing:  Test your blood sugar 1 time daily   Recommended times to test: alternate with before breakfast (fasting) or 2hrs after meals     Blood sugar targets:  Before breakfast:  (preferably < 110)  2 hours after meals: <180 (preferably <150)     Call for persistent blood sugars < 75 or > 200

## 2025-01-22 NOTE — PROGRESS NOTES
Name: Mark Kimble  Date: 2025    CHIEF COMPLAINT   Chief Complaint   Patient presents with    Diabetes     A1c was 25 at 6.6     HISTORY OF PRESENT ILLNESS   Mark Kimble is a 44 year old male who presents for follow up on diabetes management.   HbA1C: 6.6% on 2024. Previously was 7.1% on 2023  Blood glucose is: 149 in clinic today.   Patient notes that he has been feeling well and denies any health changes or taking new medications since last office visit. He continues to follow a low carb diet and staying active per usual. He has been compliant with taking all diabetes medications.     FAMILY HISTORY OF DIABETES  -father and paternal grandmother - T2DM    DIABETES HISTORY  Diagnosed: in 2016   Prior HbA, C or glycohemoglobin were 8.6% 3/2/2022; 9.0% 3/13/2023; 7.1% 2023; 6.6% 2024;     Patient has not had hospitalizations for blood sugar issues.   Denies any history of pancreatitis.     PREVIOUS MEDICATION FOR DM:  -saxenda -- d/c'ed due to severe nausea symptoms; stopped   -victoza-d/c'ed due to transition to saxenda   - Trulicity -d/c'ed due to no longer preferred by health insurance plan  - glimepiride - d/c'ed  due to improved glucose readings     CURRENT MEDICATIONS FOR DM:  Metformin 1,000mg twice daily   Ozempic 2mg once weekly - infrequently having nausea or diarrhea symptoms; most days he is tolerating well and denies any GI s/e      HOME GLUCOSE READINGS:   Testing BG x 1 daily  Meter or log book today: no   Fastins- 140s  After meals: 80s-90s   Hypoglycemia present: no      HISTORY OF DIABETES COMPLICATIONS:  History of Retinopathy: denies  - last eye exam within the last 12 months: no    - 2017 had lasik surgery   History of Neuropathy: no   History of Nephropathy: no     ASSOCIATED COMPLICATIONS:   HTN: no   Hyperlipidemia: yes   Cardiovascular Disease: no   Peripheral Vascular Disease: no     DIETARY COMPLIANCE:  Good; following a low carb diet      EXERCISE:   No -- however is more active with work    Polyuria, polyphagia, polydipsia: no   Paresthesias: no   Blurred vision: no   Recent steroids, illness or infections: no     REVIEW OF SYSTEMS  Constitutional: Negative for: weight change, fever, fatigue, cold/heat intolerance  Eyes: Negative for:  Visual changes, proptosis, blurring  ENT: Negative for:  dysphagia, neck swelling, dysphonia  Respiratory: Negative for: hemoptysis, shortness of breath, cough, or dyspnea.  Cardiovascular: Negative for:  chest pain, chest discomfort, palpitations  GI: Negative for:  abdominal pain, nausea, vomiting, diarrhea, heartburn, constipation  Neurology: Negative for: headache, dizziness, syncope, numbness/tingling, or weakness.   Genito-Urinary: Negative for: dysuria, frequency or hematuria   Hematology/Lymphatics: Negative for: bruising, easy bleeding, lower extremity edema  Skin: Negative for: rash, blister, infection or ulcers.  Endocrine: Negative for: polyuria, polydipsia. No osteoporosis. No thyroid disease.     MEDICATIONS:     Current Outpatient Medications:     aspirin 81 MG Oral Tab EC, TAKE 1 TABLET BY MOUTH EVERY DAY, Disp: 90 tablet, Rfl: 3    lisinopril 5 MG Oral Tab, Take 1 tablet (5 mg total) by mouth daily., Disp: 90 tablet, Rfl: 3    atorvastatin 10 MG Oral Tab, Take 0.5 tablets (5 mg total) by mouth nightly. **Appointment needed for further refills., Disp: 90 tablet, Rfl: 3    semaglutide (OZEMPIC, 2 MG/DOSE,) 8 MG/3ML Subcutaneous Solution Pen-injector, Inject 2 mg into the skin once a week., Disp: 9 mL, Rfl: 0    Glucose Blood (ONETOUCH ULTRA) In Vitro Strip, To test twice daily, Disp: 200 each, Rfl: 3    Blood Glucose Monitoring Suppl (ONETOUCH ULTRA 2) w/Device Does not apply Kit, Use as directed twice daily, Disp: 1 kit, Rfl: 0    Lancets Does not apply Misc, Use as directed twice daily, Disp: 200 each, Rfl: 3    metFORMIN  MG Oral Tablet 24 Hr, Take 2 tablets (1,000 mg total) by mouth 2  (two) times daily with meals., Disp: 360 tablet, Rfl: 3    Insulin Pen Needle (BD PEN NEEDLE MINI U/F) 31G X 5 MM Does not apply Misc, USE AS DIRECTED, Disp: 100 each, Rfl: 3    ALLERGIES:   No Known Allergies    SOCIAL HISTORY:   Social History     Socioeconomic History    Marital status:    Tobacco Use    Smoking status: Former     Passive exposure: Past    Smokeless tobacco: Never    Tobacco comments:     Quit 20 years ago   Vaping Use    Vaping status: Never Used   Substance and Sexual Activity    Alcohol use: Yes     Alcohol/week: 0.0 standard drinks of alcohol     Comment: YES, per [employer] beer and liquor occ    Drug use: No   Other Topics Concern    Caffeine Concern Yes     Comment: coffee       PAST MEDICAL HISTORY:   Past Medical History:    Diabetes (HCC)    Diabetes mellitus (HCC)    Hyperlipidemia    Tonsillitis    Unspecified sleep apnea       PAST SURGICAL HISTORY:   Past Surgical History:   Procedure Laterality Date    Tonsillectomy  2000       PHYSICAL EXAM:   Vitals:    01/22/25 0759   BP: 120/81   BP Location: Left arm   Patient Position: Sitting   Cuff Size: adult   Pulse: 76   Weight: 257 lb (116.6 kg)   Height: 6' 1\" (1.854 m)     BMI:   Body mass index is 33.91 kg/m².    General Appearance:  alert, well developed, in no acute distress  Nutritional:  no extreme weight gain or loss  Head: Atraumatic  Eyes:  normal conjunctivae, sclera., normal sclera and normal pupils  Throat/Neck: normal sound to voice. Normal hearing, normal speech  Back: no kyphosis  Respiratory:  Speaking in full sentences, non-labored. no increased work of breathing, no audible wheezing    Skin:  normal moisture and skin texture, no visible lesions  Hair and nails: normal scalp hair  Hematologic:  no excessive bruising  Neuro: motor grossly intact, moving all extremities without difficulty  Psychiatric:  oriented to time, self, and place  Extremities: no obvious extremity swelling, no lesions    LABS: Pertinent labs  reviewed    ASSESSMENT/PLAN:    -Reviewed with patient the pathogenesis of diabetes, clinical significance of A1c, and common complications such as: microvascular, macrovascular and diabetic ketoacidosis. Patient verbalizes understanding of the importance of glycemic control and the goals of therapy.   -Discussed with patient glucose targets ranges (Fasting  and post prandial <180).     1.Type 2 Diabetes Mellitus, controlled  -LAB DATA  HbA,C: 6.6% on 2025   a) Medications  - continue with Metformin 1,000mg twice daily    --> when you start mounjaro decrease Metformin to 500mg (1 tablet) twice daily  - continue with Ozempic 2mg once weekly until home supply is finished    --> then transition to Mounjaro 10mg once weekly     - congratulated patient on 22lb weight loss since last office visit ([id number])  - reviewed Mounjaro administration technique in the office today   -discussed to continue to work on following a low carb diet   -reviewed target goal BG readings and A1C  -reviewed when to call and notify me of abnormal BG readings.      b) No nephropathy: GFR: 106 and urine MA: <0.3mg/dL on 2025  C) reviewed importance of annual eye exams. Has referral for Dr. Norton. Encouraged that he makes apt as soon as able   D) foot exam: normal with Dr. Tejada on 2025   e) continue testing BG readings 1x daily - discussed to alternate testing times with fasting or 2hrs after meals   f) Life style changes reviewed.     2. Blood Pressure Management   -on lisinopril 5mg once daily   -normotensive today     3.Lipids Management   -LDL: 81 and Tri on 2025  -on atorvastatin 5mg at bedtime         RTC in 5 months    Patient instructed to call sooner if they develop Blood glucose readings <75 and/or if they have readings persistently >200.     The risks and benefits of my recommendations were discussed with the patient today. questions were also answered to the best of my knowledge. Patient verbalizes  understanding of these issues and agrees to the plan.    1/22/2025  CIELO Emerson

## 2025-02-05 PROBLEM — Z12.11 COLON CANCER SCREENING: Status: ACTIVE | Noted: 2025-02-05

## 2025-02-05 PROBLEM — K63.5 POLYP OF COLON: Status: ACTIVE | Noted: 2025-02-05

## 2025-02-05 PROBLEM — K64.8 INTERNAL HEMORRHOIDS: Status: ACTIVE | Noted: 2025-02-05

## 2025-02-07 ENCOUNTER — TELEPHONE (OUTPATIENT)
Facility: CLINIC | Age: 45
End: 2025-02-07

## 2025-02-07 NOTE — TELEPHONE ENCOUNTER
Health maintenance updated.  2 year colonoscopy recall placed in patient outreach.Next due on 02/05/2027 per Dr. Suazo.

## 2025-02-07 NOTE — TELEPHONE ENCOUNTER
----- Message from Chelo Suazo sent at 2/7/2025  9:22 AM CST -----  GI Staff:    Can you please place recall for this patient to have a colonoscopy in 2 years.    Thank you,  Chelo

## 2025-02-18 RX ORDER — METFORMIN HYDROCHLORIDE 500 MG/1
1000 TABLET, EXTENDED RELEASE ORAL 2 TIMES DAILY WITH MEALS
Qty: 360 TABLET | Refills: 3 | Status: SHIPPED | OUTPATIENT
Start: 2025-02-18

## 2025-04-20 DIAGNOSIS — E11.9 TYPE 2 DIABETES MELLITUS WITHOUT COMPLICATION, WITHOUT LONG-TERM CURRENT USE OF INSULIN (HCC): ICD-10-CM

## 2025-04-21 RX ORDER — TIRZEPATIDE 10 MG/.5ML
10 INJECTION, SOLUTION SUBCUTANEOUS WEEKLY
Qty: 6 ML | Refills: 0 | Status: SHIPPED | OUTPATIENT
Start: 2025-04-21

## 2025-04-21 NOTE — TELEPHONE ENCOUNTER
LOV was 1/22/2025  Was advised to follow up in 5 months.     No upcoming apts scheduled.     Endo staff please reach out to patient to help schedule a f/u apt.       Request refill signed/sent to pharmacy.     Thank you!

## 2025-05-29 ENCOUNTER — TELEPHONE (OUTPATIENT)
Dept: INTERNAL MEDICINE CLINIC | Facility: CLINIC | Age: 45
End: 2025-05-29

## 2025-07-16 ENCOUNTER — OFFICE VISIT (OUTPATIENT)
Dept: ENDOCRINOLOGY CLINIC | Facility: CLINIC | Age: 45
End: 2025-07-16

## 2025-07-16 VITALS
RESPIRATION RATE: 18 BRPM | DIASTOLIC BLOOD PRESSURE: 76 MMHG | HEIGHT: 73 IN | BODY MASS INDEX: 34.06 KG/M2 | SYSTOLIC BLOOD PRESSURE: 124 MMHG | HEART RATE: 79 BPM | WEIGHT: 257 LBS

## 2025-07-16 DIAGNOSIS — E11.65 TYPE 2 DIABETES MELLITUS WITH HYPERGLYCEMIA, WITHOUT LONG-TERM CURRENT USE OF INSULIN (HCC): Primary | ICD-10-CM

## 2025-07-16 LAB
GLUCOSE BLOOD: 237
HEMOGLOBIN A1C: 6.9 % (ref 4.3–5.6)
TEST STRIP LOT #: NORMAL NUMERIC

## 2025-07-16 PROCEDURE — 99214 OFFICE O/P EST MOD 30 MIN: CPT | Performed by: NURSE PRACTITIONER

## 2025-07-16 PROCEDURE — 82947 ASSAY GLUCOSE BLOOD QUANT: CPT | Performed by: NURSE PRACTITIONER

## 2025-07-16 PROCEDURE — 83036 HEMOGLOBIN GLYCOSYLATED A1C: CPT | Performed by: NURSE PRACTITIONER

## 2025-07-16 RX ORDER — TIRZEPATIDE 12.5 MG/.5ML
12.5 INJECTION, SOLUTION SUBCUTANEOUS WEEKLY
Qty: 6 ML | Refills: 1 | Status: SHIPPED | OUTPATIENT
Start: 2025-07-16

## 2025-07-16 NOTE — PROGRESS NOTES
Name: Mark Kimble  Date: 2025    CHIEF COMPLAINT   Chief Complaint   Patient presents with    Diabetes     Follow Up     HISTORY OF PRESENT ILLNESS   Mark Kimble is a 44 year old male who presents for follow up on diabetes management.   HbA1C: 6.9% at POC today. Previously was 6.6% on 2025.   Blood glucose is: 237 in clinic today.   Patient notes that he has been feeling well and denies any health changes or taking new medications since last office visit. He continues to follow a low carb diet and staying active per usual. He has been compliant with taking all diabetes medications.     FAMILY HISTORY OF DIABETES  -father and paternal grandmother - T2DM    DIABETES HISTORY  Diagnosed: in 2016   Prior HbA, C or glycohemoglobin were 8.6% 3/2/2022; 9.0% 3/13/2023; 7.1% 2023; 6.6% 2024; 6.9% at POC today;     Patient has not had hospitalizations for blood sugar issues.   Denies any history of pancreatitis.     PREVIOUS MEDICATION FOR DM:  -saxenda -- d/c'ed due to severe nausea symptoms; stopped   -victoza-d/c'ed due to transition to saxenda   - Trulicity -d/c'ed due to no longer preferred by health insurance plan  - glimepiride - d/c'ed  due to improved glucose readings   - ozempic -d/c'ed due to lack of glycemic control     CURRENT MEDICATIONS FOR DM:  Metformin 500mg twice daily - has been taking in AM with coffee and with dinner   Mounjaro 10mg weekly - tolerating well;denies any GI s/e     HOME GLUCOSE READINGS:   Testing BG infrequently   Meter or log book today: no   Fastins-180s; occ 130   Hypoglycemia present: no      HISTORY OF DIABETES COMPLICATIONS:  History of Retinopathy: denies  - last eye exam within the last 12 months: no    - 2017 had lasik surgery   History of Neuropathy: no   History of Nephropathy: no     ASSOCIATED COMPLICATIONS:   HTN: no   Hyperlipidemia: yes   Cardiovascular Disease: no   Peripheral Vascular Disease: no     DIETARY COMPLIANCE:  Good;  following a low carb diet     EXERCISE:   No -- however is more active with work    Polyuria, polyphagia, polydipsia: no   Paresthesias: no   Blurred vision: no   Recent steroids, illness or infections: no     REVIEW OF SYSTEMS  Constitutional: Negative for: weight change, fever, fatigue, cold/heat intolerance  Eyes: Negative for:  Visual changes, proptosis, blurring  ENT: Negative for:  dysphagia, neck swelling, dysphonia  Respiratory: Negative for: hemoptysis, shortness of breath, cough, or dyspnea.  Cardiovascular: Negative for:  chest pain, chest discomfort, palpitations  GI: Negative for:  abdominal pain, nausea, vomiting, diarrhea, heartburn, constipation  Neurology: Negative for: headache, dizziness, syncope, numbness/tingling, or weakness.   Genito-Urinary: Negative for: dysuria, frequency or hematuria   Hematology/Lymphatics: Negative for: bruising, easy bleeding, lower extremity edema  Skin: Negative for: rash, blister, infection or ulcers.  Endocrine: Negative for: polyuria, polydipsia. No osteoporosis. No thyroid disease.     MEDICATIONS:     Current Outpatient Medications:     Tirzepatide (MOUNJARO) 10 MG/0.5ML Subcutaneous Solution Auto-injector, INJECT 10 MG INTO THE SKIN ONE TIME PER WEEK, Disp: 6 mL, Rfl: 0    metFORMIN  MG Oral Tablet 24 Hr, Take 2 tablets (1,000 mg total) by mouth 2 (two) times daily with meals., Disp: 360 tablet, Rfl: 3    Multiple Vitamins-Minerals (MULTI-VITAMIN/MINERALS) Oral Tab, Take 1 tablet by mouth daily., Disp: , Rfl:     aspirin 81 MG Oral Tab EC, TAKE 1 TABLET BY MOUTH EVERY DAY, Disp: 90 tablet, Rfl: 3    lisinopril 5 MG Oral Tab, Take 1 tablet (5 mg total) by mouth daily., Disp: 90 tablet, Rfl: 3    atorvastatin 10 MG Oral Tab, Take 0.5 tablets (5 mg total) by mouth nightly. **Appointment needed for further refills., Disp: 90 tablet, Rfl: 3    Glucose Blood (ONETOUCH ULTRA) In Vitro Strip, To test twice daily, Disp: 200 each, Rfl: 3    Blood Glucose Monitoring  Suppl (ONETOUCH ULTRA 2) w/Device Does not apply Kit, Use as directed twice daily, Disp: 1 kit, Rfl: 0    Lancets Does not apply Misc, Use as directed twice daily, Disp: 200 each, Rfl: 3    ALLERGIES:   No Known Allergies    SOCIAL HISTORY:   Social History     Socioeconomic History    Marital status:    Tobacco Use    Smoking status: Former     Passive exposure: Past    Smokeless tobacco: Never    Tobacco comments:     Quit 20 years ago   Vaping Use    Vaping status: Never Used   Substance and Sexual Activity    Alcohol use: Yes     Alcohol/week: 0.0 standard drinks of alcohol     Comment: YES, per [employer] beer and liquor occ    Drug use: No   Other Topics Concern    Caffeine Concern Yes     Comment: coffee       PAST MEDICAL HISTORY:   Past Medical History:    Diabetes (HCC)    Diabetes mellitus (HCC)    High blood pressure    Hyperlipidemia    Tonsillitis    Unspecified sleep apnea       PAST SURGICAL HISTORY:   Past Surgical History:   Procedure Laterality Date    Colonoscopy N/A 2/5/2025    Procedure: COLONOSCOPY;  Surgeon: Chelo Suazo MD;  Location: [facility] MAIN OR    Tonsillectomy  2000       PHYSICAL EXAM:   Vitals:    07/16/25 1330   BP: 124/76   BP Location: Right arm   Pulse: 79   Resp: 18   Weight: 257 lb (116.6 kg)   Height: 6' 1\" (1.854 m)     BMI:   Body mass index is 33.91 kg/m².    General Appearance:  alert, well developed, in no acute distress  Nutritional:  no extreme weight gain or loss  Head: Atraumatic  Eyes:  normal conjunctivae, sclera., normal sclera and normal pupils  Throat/Neck: normal sound to voice. Normal hearing, normal speech  Back: no kyphosis  Respiratory:  Speaking in full sentences, non-labored. no increased work of breathing, no audible wheezing    Skin:  normal moisture and skin texture, no visible lesions  Hair and nails: normal scalp hair  Hematologic:  no excessive bruising  Neuro: motor grossly intact, moving all extremities without  difficulty  Psychiatric:  oriented to time, self, and place  Extremities: no obvious extremity swelling, no lesions    LABS: Pertinent labs reviewed    ASSESSMENT/PLAN:    -Reviewed with patient the pathogenesis of diabetes, clinical significance of A1c, and common complications such as: microvascular, macrovascular and diabetic ketoacidosis. Patient verbalizes understanding of the importance of glycemic control and the goals of therapy.   -Discussed with patient glucose targets ranges (Fasting  and post prandial <180).     1.Type 2 Diabetes Mellitus, controlled  -LAB DATA  HbA,C: 6.9% today   a) Medications  - continue with Metformin 500mg twice daily --> discussed to take with lunch and dinner meal   --> hold lunch dose if eating a smaller meal or if skipping   - increase Mounjaro 10 --> 12.5mg once weekly - Risks and benefits reviewed. Verbalizes understanding.    -discussed to continue to work on following a low carb diet   -reviewed target goal BG readings and A1C  -reviewed when to call and notify me of abnormal BG readings.      b) No nephropathy: GFR: 106 and urine MA: <0.3mg/dL on 2025  C) reviewed importance of annual eye exams; has referral for Dr. Paz --> encouraged to schedule apt as soon as able   D) foot exam: normal with Dr. Tejada on 2025   e) continue testing BG readings 1x daily - discussed to alternate testing times with fasting or 2hrs after meals   f) Life style changes reviewed.     2. Blood Pressure Management   -on lisinopril 5mg once daily   -normotensive today     3.Lipids Management   -LDL: 81 and Tri on 2025  -on atorvastatin 5mg at bedtime       RTC in 6 months    Patient instructed to call sooner if they develop Blood glucose readings <75 and/or if they have readings persistently >200.     The risks and benefits of my recommendations were discussed with the patient today. questions were also answered to the best of my knowledge. Patient verbalizes  understanding of these issues and agrees to the plan.    7/16/2025  CIELO Emerson

## 2025-07-16 NOTE — PATIENT INSTRUCTIONS
A1C: 6.9% today --> previously was 6.6% on 1/9/2025  Blood glucose: 237 in clinic today    Medications:   -continue with Metformin   500mg with lunch   --> hold if eating a smaller meal or skipping meal   500mg with dinner    - increase Mounjaro 10 --> 12.5mg once weekly    - continue to follow a low carb diet   - continue to stay active - goal at least 150 mins per week     Weight:  Wt Readings from Last 6 Encounters:   07/16/25 257 lb (116.6 kg)   02/05/25 260 lb (117.9 kg)   01/22/25 257 lb (116.6 kg)   01/09/25 257 lb (116.6 kg)   09/30/24 267 lb (121.1 kg)   11/13/23 270 lb (122.5 kg)     A1C goal:  <7.0% (preferably <6.5%)    Blood sugar testing:  Test your blood sugar 1 time daily   Recommended times to test: alternate with before breakfast (fasting) or 2hrs after meals     Blood sugar targets:  Before breakfast:  (preferably < 110)  Before meals< 120   2 hours after meals: <150      Call for persistent blood sugars < 75 or > 200

## (undated) NOTE — LETTER
02/25/20        [address] [facility][note 3] [address] Tommy Kimble,    Nuestros registros indican que tiene análisis clínicos pendientes y/o pruebas que se ordenaron en tyler nombre que no se stern completado.     Para felix

## (undated) NOTE — MR AVS SNAPSHOT
Gabriela  [address]  [phone]               Thank you for choosing us for your health care visit with [address] [place] [address].   We are glad to serve you and happy to provide you with [facility] MetFORMIN HCl  MG Tb24   TAKE 2 TABLETS BY MOUTH EVERY DAY WITH BREAKFAST   Commonly known as:  [facility] can access your MyChart to more actively manage your health care and view more details fro 2 ½ hours per week – spread out over time Use a maykel to keep you motivated   Don’t forget strength training with weights and resistance Set goals and track your progress   You don’t need to join a gym. Home exercises work great.  Put more priority on exe

## (undated) NOTE — Clinical Note
Hi - this patient would really benefit from septoplasty, inferior turbinate reduction, and revision adenoidectomy (had them out, has regrowth). If you proceed with surgery he could also possibly get PE tubes at the same times as surgery. I recommended he see you.

## (undated) NOTE — LETTER
06/25/20        [address]      Dear Mendez Nixon,  Please call and schedule an appt to establish care with a new provider to have the following test done.      Our records indicate that you have outstanding lab

## (undated) NOTE — Clinical Note
May 11, 2017    Maggi Au DO  [place] [id number]     Patient: Radha López   YOB: 1980   Date of Visit: 5/11/2017       Dear Dr. Alberto Ortiz DO:    Thank you for referring Gulshan Schulte to me fo ASPIRIN 81 MG Oral Tab EC TAKE 1 TABLET (81 MG TOTAL) BY MOUTH DAILY.  Disp: 90 tablet Rfl: 3   METFORMIN HCL  MG Oral Tablet 24 Hr TAKE 2 TABLETS BY MOUTH EVERY DAY WITH BREAKFAST Disp: 180 tablet Rfl: 11   Liraglutide (VICTOZA) 18 MG/3ML Subcutaneou Right Left    Disc Good rim, Temporal crescent Good rim, Temporal crescent    C/D Ratio 0.1 0.2    Macula no BDR, mild ERM nasal to fovea  Normal- no BDR    Vessels Normal Normal    Periphery Normal Normal            Refraction     Wearing Rx     Type:

## (undated) NOTE — LETTER
Piper Bennett Do [place] [address] [facility], [address]       01/10/19        Patient: Elly Gutiérrez   YOB: 1980   Date of Visit: 1/10/2019       Dear  Dr. Jeri Brady,      Thank you for referring Elly Cottondanyelleallen

## (undated) NOTE — MR AVS SNAPSHOT
Gabriela  [address]  [phone]               Thank you for choosing us for your health care visit with Juju Ruiz MD.  We are glad to serve you and happy to provide you with this summa BD PEN NEEDLE MINI U/F 31G X 5 MM Misc   Generic drug:  Insulin Pen Needle   USE AS DIRECTED           Liraglutide 18 MG/3ML Sopn   1.8mg sq q day please disp pen 31 bd needles. Quantity sufficient for 3 mo and refills x 1 Year.    Commonly known as:  VICT Tips for increasing your physical activity – Adults who are physically active are less likely to develop some chronic diseases than adults who are inactive.      HOW TO GET STARTED: HOW TO STAY MOTIVATED:   Start activities slowly and build up over time Do